# Patient Record
Sex: MALE | Race: WHITE | Employment: OTHER | URBAN - METROPOLITAN AREA
[De-identification: names, ages, dates, MRNs, and addresses within clinical notes are randomized per-mention and may not be internally consistent; named-entity substitution may affect disease eponyms.]

---

## 2018-04-30 ENCOUNTER — HOSPITAL ENCOUNTER (EMERGENCY)
Age: 73
Discharge: HOME | End: 2018-04-30
Payer: MEDICARE

## 2018-04-30 ENCOUNTER — APPOINTMENT (OUTPATIENT)
Dept: GENERAL RADIOLOGY | Age: 73
End: 2018-04-30
Payer: MEDICARE

## 2018-04-30 VITALS
HEIGHT: 71 IN | OXYGEN SATURATION: 94 % | DIASTOLIC BLOOD PRESSURE: 84 MMHG | WEIGHT: 194 LBS | TEMPERATURE: 98 F | SYSTOLIC BLOOD PRESSURE: 130 MMHG | HEART RATE: 84 BPM | BODY MASS INDEX: 27.16 KG/M2 | RESPIRATION RATE: 18 BRPM

## 2018-04-30 DIAGNOSIS — R07.81 RIB PAIN ON RIGHT SIDE: Primary | ICD-10-CM

## 2018-04-30 PROCEDURE — 99212 OFFICE O/P EST SF 10 MIN: CPT

## 2018-04-30 PROCEDURE — 71046 X-RAY EXAM CHEST 2 VIEWS: CPT

## 2018-04-30 RX ORDER — HYDROCODONE BITARTRATE AND ACETAMINOPHEN 5; 325 MG/1; MG/1
1 TABLET ORAL EVERY 6 HOURS PRN
Qty: 12 TABLET | Refills: 0 | Status: SHIPPED | OUTPATIENT
Start: 2018-04-30 | End: 2018-05-03

## 2018-04-30 RX ORDER — PREDNISONE 20 MG/1
20 TABLET ORAL DAILY PRN
COMMUNITY

## 2018-04-30 RX ORDER — FOLIC ACID 1 MG/1
1 TABLET ORAL DAILY
COMMUNITY
End: 2019-06-18 | Stop reason: ALTCHOICE

## 2018-04-30 RX ORDER — SIMVASTATIN 10 MG
10 TABLET ORAL NIGHTLY
Status: ON HOLD | COMMUNITY
End: 2020-11-19 | Stop reason: HOSPADM

## 2018-04-30 ASSESSMENT — PAIN DESCRIPTION - PAIN TYPE: TYPE: ACUTE PAIN

## 2018-04-30 ASSESSMENT — PAIN DESCRIPTION - DESCRIPTORS: DESCRIPTORS: SHARP

## 2018-04-30 ASSESSMENT — PAIN SCALES - GENERAL: PAINLEVEL_OUTOF10: 8

## 2018-04-30 ASSESSMENT — PAIN DESCRIPTION - ONSET: ONSET: SUDDEN

## 2018-04-30 ASSESSMENT — PAIN DESCRIPTION - FREQUENCY: FREQUENCY: INTERMITTENT

## 2018-04-30 ASSESSMENT — PAIN DESCRIPTION - ORIENTATION: ORIENTATION: RIGHT

## 2018-04-30 ASSESSMENT — PAIN DESCRIPTION - LOCATION: LOCATION: RIB CAGE

## 2018-04-30 ASSESSMENT — PAIN DESCRIPTION - PROGRESSION: CLINICAL_PROGRESSION: GRADUALLY WORSENING

## 2019-06-18 ENCOUNTER — APPOINTMENT (OUTPATIENT)
Dept: CT IMAGING | Age: 74
End: 2019-06-18
Payer: MEDICARE

## 2019-06-18 ENCOUNTER — HOSPITAL ENCOUNTER (EMERGENCY)
Age: 74
Discharge: ANOTHER HOSPITAL | End: 2019-06-18
Payer: MEDICARE

## 2019-06-18 ENCOUNTER — HOSPITAL ENCOUNTER (EMERGENCY)
Age: 74
Discharge: LEFT BEFORE DISCHARGE | End: 2019-06-18
Attending: EMERGENCY MEDICINE
Payer: MEDICARE

## 2019-06-18 VITALS
RESPIRATION RATE: 20 BRPM | SYSTOLIC BLOOD PRESSURE: 155 MMHG | TEMPERATURE: 97.5 F | HEART RATE: 84 BPM | OXYGEN SATURATION: 95 % | BODY MASS INDEX: 25.76 KG/M2 | WEIGHT: 184 LBS | DIASTOLIC BLOOD PRESSURE: 90 MMHG | HEIGHT: 71 IN

## 2019-06-18 VITALS
TEMPERATURE: 98 F | WEIGHT: 185.25 LBS | DIASTOLIC BLOOD PRESSURE: 94 MMHG | HEIGHT: 71 IN | OXYGEN SATURATION: 95 % | RESPIRATION RATE: 16 BRPM | HEART RATE: 76 BPM | BODY MASS INDEX: 25.94 KG/M2 | SYSTOLIC BLOOD PRESSURE: 147 MMHG

## 2019-06-18 DIAGNOSIS — R10.9 ABDOMINAL PAIN, UNSPECIFIED ABDOMINAL LOCATION: Primary | ICD-10-CM

## 2019-06-18 DIAGNOSIS — K57.20 DIVERTICULITIS OF LARGE INTESTINE WITH PERFORATION WITHOUT BLEEDING: Primary | ICD-10-CM

## 2019-06-18 PROBLEM — R19.8 PERFORATED VISCUS: Status: ACTIVE | Noted: 2019-06-18

## 2019-06-18 LAB
ALBUMIN SERPL-MCNC: 4 G/DL (ref 3.5–5.2)
ALP BLD-CCNC: 86 U/L (ref 40–129)
ALT SERPL-CCNC: 16 U/L (ref 0–40)
ANION GAP SERPL CALCULATED.3IONS-SCNC: 14 MMOL/L (ref 7–16)
AST SERPL-CCNC: 19 U/L (ref 0–39)
BASOPHILS ABSOLUTE: 0 E9/L (ref 0–0.2)
BASOPHILS RELATIVE PERCENT: 0.3 % (ref 0–2)
BILIRUB SERPL-MCNC: 0.5 MG/DL (ref 0–1.2)
BUN BLDV-MCNC: 15 MG/DL (ref 8–23)
CALCIUM SERPL-MCNC: 9.4 MG/DL (ref 8.6–10.2)
CHLORIDE BLD-SCNC: 99 MMOL/L (ref 98–107)
CO2: 26 MMOL/L (ref 22–29)
CREAT SERPL-MCNC: 1 MG/DL (ref 0.7–1.2)
EKG ATRIAL RATE: 76 BPM
EKG P AXIS: 71 DEGREES
EKG P-R INTERVAL: 144 MS
EKG Q-T INTERVAL: 378 MS
EKG QRS DURATION: 84 MS
EKG QTC CALCULATION (BAZETT): 425 MS
EKG R AXIS: 14 DEGREES
EKG T AXIS: 70 DEGREES
EKG VENTRICULAR RATE: 76 BPM
EOSINOPHILS ABSOLUTE: 0 E9/L (ref 0.05–0.5)
EOSINOPHILS RELATIVE PERCENT: 0.2 % (ref 0–6)
GFR AFRICAN AMERICAN: >60
GFR NON-AFRICAN AMERICAN: >60 ML/MIN/1.73
GLUCOSE BLD-MCNC: 105 MG/DL (ref 74–99)
HCT VFR BLD CALC: 47.7 % (ref 37–54)
HEMOGLOBIN: 15.8 G/DL (ref 12.5–16.5)
LIPASE: 15 U/L (ref 13–60)
LYMPHOCYTES ABSOLUTE: 1.21 E9/L (ref 1.5–4)
LYMPHOCYTES RELATIVE PERCENT: 7 % (ref 20–42)
MCH RBC QN AUTO: 31.6 PG (ref 26–35)
MCHC RBC AUTO-ENTMCNC: 33.1 % (ref 32–34.5)
MCV RBC AUTO: 95.4 FL (ref 80–99.9)
MONOCYTES ABSOLUTE: 0.86 E9/L (ref 0.1–0.95)
MONOCYTES RELATIVE PERCENT: 5.3 % (ref 2–12)
NEUTROPHILS ABSOLUTE: 15.22 E9/L (ref 1.8–7.3)
NEUTROPHILS RELATIVE PERCENT: 87.7 % (ref 43–80)
PDW BLD-RTO: 12.8 FL (ref 11.5–15)
PLATELET # BLD: 373 E9/L (ref 130–450)
PMV BLD AUTO: 10.3 FL (ref 7–12)
POTASSIUM REFLEX MAGNESIUM: 4.2 MMOL/L (ref 3.5–5)
RBC # BLD: 5 E12/L (ref 3.8–5.8)
RBC # BLD: NORMAL 10*6/UL
SODIUM BLD-SCNC: 139 MMOL/L (ref 132–146)
TOTAL PROTEIN: 7.8 G/DL (ref 6.4–8.3)
TROPONIN: <0.01 NG/ML (ref 0–0.03)
WBC # BLD: 17.3 E9/L (ref 4.5–11.5)

## 2019-06-18 PROCEDURE — 1200000000 HC SEMI PRIVATE

## 2019-06-18 PROCEDURE — 99284 EMERGENCY DEPT VISIT MOD MDM: CPT

## 2019-06-18 PROCEDURE — 93005 ELECTROCARDIOGRAM TRACING: CPT | Performed by: EMERGENCY MEDICINE

## 2019-06-18 PROCEDURE — 6360000002 HC RX W HCPCS: Performed by: EMERGENCY MEDICINE

## 2019-06-18 PROCEDURE — 80053 COMPREHEN METABOLIC PANEL: CPT

## 2019-06-18 PROCEDURE — 96375 TX/PRO/DX INJ NEW DRUG ADDON: CPT

## 2019-06-18 PROCEDURE — 2500000003 HC RX 250 WO HCPCS: Performed by: EMERGENCY MEDICINE

## 2019-06-18 PROCEDURE — 74177 CT ABD & PELVIS W/CONTRAST: CPT

## 2019-06-18 PROCEDURE — 85025 COMPLETE CBC W/AUTO DIFF WBC: CPT

## 2019-06-18 PROCEDURE — 36415 COLL VENOUS BLD VENIPUNCTURE: CPT

## 2019-06-18 PROCEDURE — 84484 ASSAY OF TROPONIN QUANT: CPT

## 2019-06-18 PROCEDURE — 83690 ASSAY OF LIPASE: CPT

## 2019-06-18 PROCEDURE — 99212 OFFICE O/P EST SF 10 MIN: CPT

## 2019-06-18 PROCEDURE — 93010 ELECTROCARDIOGRAM REPORT: CPT | Performed by: INTERNAL MEDICINE

## 2019-06-18 PROCEDURE — 2580000003 HC RX 258: Performed by: EMERGENCY MEDICINE

## 2019-06-18 PROCEDURE — 6360000004 HC RX CONTRAST MEDICATION: Performed by: RADIOLOGY

## 2019-06-18 PROCEDURE — 96365 THER/PROPH/DIAG IV INF INIT: CPT

## 2019-06-18 RX ORDER — SODIUM CHLORIDE 0.9 % (FLUSH) 0.9 %
10 SYRINGE (ML) INJECTION EVERY 12 HOURS SCHEDULED
Status: CANCELLED | OUTPATIENT
Start: 2019-06-18

## 2019-06-18 RX ORDER — MORPHINE SULFATE 4 MG/ML
4 INJECTION, SOLUTION INTRAMUSCULAR; INTRAVENOUS
Status: CANCELLED | OUTPATIENT
Start: 2019-06-18

## 2019-06-18 RX ORDER — SODIUM CHLORIDE 0.9 % (FLUSH) 0.9 %
10 SYRINGE (ML) INJECTION PRN
Status: CANCELLED | OUTPATIENT
Start: 2019-06-18

## 2019-06-18 RX ORDER — ONDANSETRON 2 MG/ML
4 INJECTION INTRAMUSCULAR; INTRAVENOUS EVERY 6 HOURS PRN
Status: CANCELLED | OUTPATIENT
Start: 2019-06-18

## 2019-06-18 RX ORDER — MORPHINE SULFATE 4 MG/ML
2 INJECTION, SOLUTION INTRAMUSCULAR; INTRAVENOUS
Status: CANCELLED | OUTPATIENT
Start: 2019-06-18

## 2019-06-18 RX ORDER — SODIUM CHLORIDE, SODIUM LACTATE, POTASSIUM CHLORIDE, CALCIUM CHLORIDE 600; 310; 30; 20 MG/100ML; MG/100ML; MG/100ML; MG/100ML
INJECTION, SOLUTION INTRAVENOUS CONTINUOUS
Status: CANCELLED | OUTPATIENT
Start: 2019-06-18

## 2019-06-18 RX ORDER — ACETAMINOPHEN 325 MG/1
650 TABLET ORAL EVERY 4 HOURS PRN
Status: CANCELLED | OUTPATIENT
Start: 2019-06-18

## 2019-06-18 RX ORDER — CEFDINIR 300 MG/1
300 CAPSULE ORAL 2 TIMES DAILY
Qty: 20 CAPSULE | Refills: 0 | Status: SHIPPED | OUTPATIENT
Start: 2019-06-18 | End: 2019-06-28

## 2019-06-18 RX ORDER — CIPROFLOXACIN 2 MG/ML
400 INJECTION, SOLUTION INTRAVENOUS EVERY 12 HOURS
Status: CANCELLED | OUTPATIENT
Start: 2019-06-18

## 2019-06-18 RX ORDER — METRONIDAZOLE 500 MG/1
500 TABLET ORAL 2 TIMES DAILY
Qty: 20 TABLET | Refills: 0 | Status: SHIPPED | OUTPATIENT
Start: 2019-06-18 | End: 2019-06-28

## 2019-06-18 RX ADMIN — IOPAMIDOL 80 ML: 755 INJECTION, SOLUTION INTRAVENOUS at 11:37

## 2019-06-18 RX ADMIN — IOHEXOL 50 ML: 240 INJECTION, SOLUTION INTRATHECAL; INTRAVASCULAR; INTRAVENOUS; ORAL at 11:37

## 2019-06-18 RX ADMIN — METRONIDAZOLE 500 MG: 500 INJECTION, SOLUTION INTRAVENOUS at 12:55

## 2019-06-18 RX ADMIN — WATER 2 G: 1 INJECTION INTRAMUSCULAR; INTRAVENOUS; SUBCUTANEOUS at 12:54

## 2019-06-18 ASSESSMENT — PAIN DESCRIPTION - LOCATION
LOCATION: ABDOMEN
LOCATION: ABDOMEN

## 2019-06-18 ASSESSMENT — PAIN - FUNCTIONAL ASSESSMENT: PAIN_FUNCTIONAL_ASSESSMENT: PREVENTS OR INTERFERES SOME ACTIVE ACTIVITIES AND ADLS

## 2019-06-18 ASSESSMENT — PAIN DESCRIPTION - PAIN TYPE
TYPE: ACUTE PAIN
TYPE: ACUTE PAIN

## 2019-06-18 ASSESSMENT — PAIN DESCRIPTION - ONSET
ONSET: ON-GOING
ONSET: GRADUAL

## 2019-06-18 ASSESSMENT — PAIN DESCRIPTION - DESCRIPTORS
DESCRIPTORS: CONSTANT;DISCOMFORT;SHARP
DESCRIPTORS: SHARP

## 2019-06-18 ASSESSMENT — PAIN DESCRIPTION - FREQUENCY
FREQUENCY: INTERMITTENT
FREQUENCY: CONTINUOUS

## 2019-06-18 ASSESSMENT — PAIN DESCRIPTION - ORIENTATION
ORIENTATION: RIGHT;LEFT;MID
ORIENTATION: LOWER;MID

## 2019-06-18 ASSESSMENT — PAIN DESCRIPTION - PROGRESSION: CLINICAL_PROGRESSION: GRADUALLY WORSENING

## 2019-06-18 ASSESSMENT — PAIN SCALES - GENERAL
PAINLEVEL_OUTOF10: 8
PAINLEVEL_OUTOF10: 8

## 2019-06-18 NOTE — ED PROVIDER NOTES
HPI:  6/18/19, Time: 10:04 AM         Marcy Grimaldo is a 75 y.o. male presenting to the ED for abdominal pain, beginning about a week ago. The complaint has been intermittent, moderate in severity, and worsened by nothing. Patient states that intermittently over the last week he has had some lower abdominal pain which she describes as crampy and aching. He also states that he has had a change in his bowel habits. He states that he feels constipated, that he has to strain very hard to pass stools and they are small and firm. He denies any blood in the stool or pain with passing the stool. He denies any nausea or vomiting. He denies any fever or chills. He denies any urinary changes. He denies any previous abdominal surgeries. Review of Systems:   Pertinent positives and negatives are stated within HPI, all other systems reviewed and are negative.          --------------------------------------------- PAST HISTORY ---------------------------------------------  Past Medical History:  has a past medical history of Arthritis and Hyperlipidemia. Past Surgical History:  has a past surgical history that includes fracture surgery. Social History:  reports that he quit smoking about 3 years ago. His smoking use included cigarettes. He has never used smokeless tobacco. He reports that he drinks alcohol. He reports that he does not use drugs. Family History: family history is not on file. The patients home medications have been reviewed. Allergies: Patient has no known allergies.     -------------------------------------------------- RESULTS -------------------------------------------------  All laboratory and radiology results have been personally reviewed by myself   LABS:  Results for orders placed or performed during the hospital encounter of 06/18/19   CBC Auto Differential   Result Value Ref Range    WBC 17.3 (H) 4.5 - 11.5 E9/L    RBC 5.00 3.80 - 5.80 E12/L    Hemoglobin 15.8 12.5 - 16.5 g/dL    Hematocrit 47.7 37.0 - 54.0 %    MCV 95.4 80.0 - 99.9 fL    MCH 31.6 26.0 - 35.0 pg    MCHC 33.1 32.0 - 34.5 %    RDW 12.8 11.5 - 15.0 fL    Platelets 626 911 - 918 E9/L    MPV 10.3 7.0 - 12.0 fL    Neutrophils % 87.7 (H) 43.0 - 80.0 %    Lymphocytes % 7.0 (L) 20.0 - 42.0 %    Monocytes % 5.3 2.0 - 12.0 %    Eosinophils % 0.2 0.0 - 6.0 %    Basophils % 0.3 0.0 - 2.0 %    Neutrophils # 15.22 (H) 1.80 - 7.30 E9/L    Lymphocytes # 1.21 (L) 1.50 - 4.00 E9/L    Monocytes # 0.86 0.10 - 0.95 E9/L    Eosinophils # 0.00 (L) 0.05 - 0.50 E9/L    Basophils # 0.00 0.00 - 0.20 E9/L    RBC Morphology Normal    Comprehensive Metabolic Panel w/ Reflex to MG   Result Value Ref Range    Sodium 139 132 - 146 mmol/L    Potassium reflex Magnesium 4.2 3.5 - 5.0 mmol/L    Chloride 99 98 - 107 mmol/L    CO2 26 22 - 29 mmol/L    Anion Gap 14 7 - 16 mmol/L    Glucose 105 (H) 74 - 99 mg/dL    BUN 15 8 - 23 mg/dL    CREATININE 1.0 0.7 - 1.2 mg/dL    GFR Non-African American >60 >=60 mL/min/1.73    GFR African American >60     Calcium 9.4 8.6 - 10.2 mg/dL    Total Protein 7.8 6.4 - 8.3 g/dL    Alb 4.0 3.5 - 5.2 g/dL    Total Bilirubin 0.5 0.0 - 1.2 mg/dL    Alkaline Phosphatase 86 40 - 129 U/L    ALT 16 0 - 40 U/L    AST 19 0 - 39 U/L   Lipase   Result Value Ref Range    Lipase 15 13 - 60 U/L   Troponin   Result Value Ref Range    Troponin <0.01 0.00 - 0.03 ng/mL   EKG 12 Lead   Result Value Ref Range    Ventricular Rate 76 BPM    Atrial Rate 76 BPM    P-R Interval 144 ms    QRS Duration 84 ms    Q-T Interval 378 ms    QTc Calculation (Bazett) 425 ms    P Axis 71 degrees    R Axis 14 degrees    T Axis 70 degrees       RADIOLOGY:  Interpreted by Radiologist.  CT ABDOMEN PELVIS W IV CONTRAST Additional Contrast? Oral   Final Result   1. Abnormal appearance of the sigmoid segment of colon. Differential   favors diverticulitis with contained mesenteric perforation. No   drainable abscess. Perforated neoplasm is not excluded.    2. Other Given 6/18/19 1137)   cefTRIAXone (ROCEPHIN) 2 g in sterile water 20 mL IV syringe (2 g Intravenous Given 6/18/19 1254)   metronidazole (FLAGYL) 500 mg in NaCl 100 mL IVPB premix (0 mg Intravenous Stopped 6/18/19 1358)         ED COURSE:  ED Course as of Jun 18 1359   Tue Jun 18, 2019   1214   ATTENDING PROVIDER ATTESTATION:     I have personally performed and/or participated in the history, exam, medical decision making, and procedures and agree with all pertinent clinical information unless otherwise noted. I have also reviewed and agree with the past medical, family and social history unless otherwise noted. I have discussed this patient in detail with the resident and provided the instruction and education regarding the evidence-based evaluation and treatment of [unfilled]  History: patient began with abdominal pain 6 days ago. He states the pain improved and then return 3 days ago getting worse than the 1st episode. He denies n/v/d/c or blood in the stool. My findings: Jacob Orr is a 75 y.o. male whom is in no distress. Physical exam reveals heart RRR, lungs CTA, abdomen is soft and mildly diffuse tenderness. No R/G/R. My plan: Symptomatic and supportive care. Diverticulitis with micro perforation noted on CT. Will discuss with surgery. Electronically signed by Musa Paige DO on 6/18/19 at 12:14 PM          [JS]   4597 Patient does have evidence of diverticulitis with mesentery perforation. Updated him about this finding and the possible need for admission. He does not want to be admitted but is agreeable with us speaking to general surgery about the findings. He denies new or worsening pain and his abdominal exam is unchanged. [BM]   [phone] Spoke with Dr. Giovanni Aase who recommends admitting the patient for IV antibiotics.      [BM]   2636 Discussed with the patient the recommendation of admission for IV antibiotics and he states that he is agreeable to get a first dose of IV antibiotics and he wants antibiotics to go home with but he cannot stay in the hospital as his granddaughter is coming to visit from [place]. Patient will have his first dose of IV antibiotics and explained that if it anytime he changes his mind and wishes to come back for evaluation and possible admission he is welcome. [BM]      ED Course User Index  [BM] Suzie Zaragoza DO  [JS] Hair Galloway DO     This patient has chosen to leave against medical advice. I have personally explained to them that choosing to do so may result in permanent bodily harm or death. I discussed at length that without further evaluation and monitoring there may be unforeseen circumstances and deterioration resulting in permanent bodily harm or death as a result of their choice. They are alert, oriented, and competent at this time. They state that they are aware of the serious risks as explained, but they continue to wish to leave against medical advice. In light of their decision to leave against medical advice, follow-up has been arranged and they are aware of the importance of following up as instructed. They have been advised that they should return to the ED immediately if they change their mind at any time, or if their condition begins to change or worsen. --------------------------------- IMPRESSION AND DISPOSITION ---------------------------------    IMPRESSION  1.  Diverticulitis of large intestine with perforation without bleeding        DISPOSITION  Disposition: Other Disposition: Left AMA  Patient condition is unknown       Suzie Zaragoza DO  Resident  06/18/19 2851

## 2019-07-08 ENCOUNTER — OFFICE VISIT (OUTPATIENT)
Dept: SURGERY | Age: 74
End: 2019-07-08
Payer: MEDICARE

## 2019-07-08 VITALS
BODY MASS INDEX: 25.76 KG/M2 | RESPIRATION RATE: 20 BRPM | TEMPERATURE: 98.3 F | WEIGHT: 184 LBS | SYSTOLIC BLOOD PRESSURE: 118 MMHG | HEIGHT: 71 IN | OXYGEN SATURATION: 96 % | HEART RATE: 96 BPM | DIASTOLIC BLOOD PRESSURE: 73 MMHG

## 2019-07-08 DIAGNOSIS — K57.20 DIVERTICULITIS OF LARGE INTESTINE WITH PERFORATION WITHOUT ABSCESS OR BLEEDING: ICD-10-CM

## 2019-07-08 DIAGNOSIS — K57.32 DIVERTICULITIS OF COLON: Primary | ICD-10-CM

## 2019-07-08 PROCEDURE — 99204 OFFICE O/P NEW MOD 45 MIN: CPT | Performed by: SURGERY

## 2019-07-08 NOTE — PATIENT INSTRUCTIONS
Patient Education   Patient Education        Diverticulitis: Care Instructions  Your Care Instructions    Diverticulitis occurs when pouches form in the wall of the colon and become inflamed or infected. It can be very painful. Doctors aren't sure what causes diverticulitis. There is no proof that foods such as nuts, seeds, or berries cause it or make it worse. A low-fiber diet may cause the colon to work harder to push stool forward. Pouches may form because of this extra work. It may be hard to think about healthy eating while you're in pain. But as you recover, you might think about how you can use healthy eating for overall better health. Healthy eating may help you avoid future attacks. Follow-up care is a key part of your treatment and safety. Be sure to make and go to all appointments, and call your doctor if you are having problems. It's also a good idea to know your test results and keep a list of the medicines you take. How can you care for yourself at home? · Drink plenty of fluids, enough so that your urine is light yellow or clear like water. If you have kidney, heart, or liver disease and have to limit fluids, talk with your doctor before you increase the amount of fluids you drink. · Stick to liquids or a bland diet (plain rice, bananas, dry toast or crackers, applesauce) until you are feeling better. Then you can return to regular foods and gradually increase the amount of fiber in your diet. · Use a heating pad set on low on your belly to relieve mild cramps and pain. · Get extra rest until you are feeling better. · Be safe with medicines. Read and follow all instructions on the label. ? If the doctor gave you a prescription medicine for pain, take it as prescribed. ? If you are not taking a prescription pain medicine, ask your doctor if you can take an over-the-counter medicine. · If your doctor prescribed antibiotics, take them as directed.  Do not stop taking them just because you feel

## 2019-07-08 NOTE — PROGRESS NOTES
General Surgery History and Physical    Patient's Name/Date of Birth: Kavitha Garcia / [birth year]    Date: July 8, 2019     Surgeon: Alexandro Perdomo M.D.    PCP: Kelvin Moran DO     Chief Complaint: recent diverticulitis    HPI:   Kavitha Garcia is a 75 y.o. male who presents for evaluation of recent diverticulitis and was treated as outpt with abx and has recovered without issue. No previous coloscopy. Past Medical History:   Diagnosis Date    Arthritis     Hyperlipidemia        Past Surgical History:   Procedure Laterality Date    FRACTURE SURGERY         Current Outpatient Medications   Medication Sig Dispense Refill    methotrexate (RHEUMATREX) 2.5 MG chemo tablet Take 12.5 mg by mouth once a week Sunday      predniSONE (DELTASONE) 20 MG tablet Take 20 mg by mouth daily as needed (Arthritis Pain)       simvastatin (ZOCOR) 10 MG tablet Take 10 mg by mouth nightly       No current facility-administered medications for this visit. No Known Allergies    The patient has a family history that is negative for severe cardiovascular or respiratory issues, negative for reaction to anesthesia.     Social History     Socioeconomic History    Marital status:      Spouse name: Not on file    Number of children: Not on file    Years of education: Not on file    Highest education level: Not on file   Occupational History    Not on file   Social Needs    Financial resource strain: Not on file    Food insecurity:     Worry: Not on file     Inability: Not on file    Transportation needs:     Medical: Not on file     Non-medical: Not on file   Tobacco Use    Smoking status: Former Smoker     Types: Cigarettes     Last attempt to quit: 4/30/2016     Years since quitting: 3.1    Smokeless tobacco: Never Used   Substance and Sexual Activity    Alcohol use: Yes     Comment: rare    Drug use: Never    Sexual activity: Not on file   Lifestyle    Physical activity:     Days per week: Not on file

## 2019-07-09 ENCOUNTER — TELEPHONE (OUTPATIENT)
Dept: SURGERY | Age: 74
End: 2019-07-09

## 2019-07-22 ENCOUNTER — PREP FOR PROCEDURE (OUTPATIENT)
Dept: SURGERY | Age: 74
End: 2019-07-22

## 2019-07-22 RX ORDER — SODIUM CHLORIDE, SODIUM LACTATE, POTASSIUM CHLORIDE, CALCIUM CHLORIDE 600; 310; 30; 20 MG/100ML; MG/100ML; MG/100ML; MG/100ML
INJECTION, SOLUTION INTRAVENOUS CONTINUOUS
Status: CANCELLED | OUTPATIENT
Start: 2019-07-22

## 2019-07-26 ENCOUNTER — TELEPHONE (OUTPATIENT)
Dept: SURGERY | Age: 74
End: 2019-07-26

## 2019-09-17 NOTE — ED PROVIDER NOTES
Apply clobetasol ointment twice daily as needed to rash on the back until clear. Restart as needed.     Wash scalp with salicylic acid shampoo 2-3 times weekly. You can get this over the counter at a drug store. T-sal is one example. Samples were given in clinic. Stop ketoconazole shampoo. Apply clobetasol solution twice daily as needed to scaly areas on the scalp until clear.    LIQUID NITROGEN INSTRUCTIONS    Today we treated your actinic keratosis with liquid nitrogen.    Freezing with liquid nitrogen is accompanied by a stinging, burning sensation which usually lasts from 15 minutes up to several hours.     It is normal for a blister to sometimes form at the treatment site.  Occasionally this will be a blood blister.  It is usually best to leave the blister unopened.  The blister normally breaks in the first few days, but on the fingers it can last longer.  If it is painful, it can be opened after cleansing the area with soap and water followed by rubbing alcohol.  A needle that has been sterilized with a match and then wiped clean with rubbing alcohol can be used to open the blister.  Wash hands before opening the blister. The blister may form into a dry crust.  The crust should peel off in 2-4 weeks.  There may be some minimal redness after the crust falls off, but this should fade with time.     It is okay to wash, shampoo, shower or apply cosmetics to the area, but the area should not be rubbed as this can irritate it.  Apply Vaseline (petroleum jelly)  to the treated area if it is raw.    Possible side effects: risk of permanent skin color change (lighter or darker skin), persistence, blister, crusting, discomfort, infection.    Please call if any questions or concerns. We can be reached at:        [facility]: [phone]  [facility]: [phone]    Maggy Mcgarry MD        WOUND CARE INSTRUCTIONS AFTER SKIN BIOPSY    · Leave the bandage DRY and IN-PLACE for 24 hours.    · Wash your  This is a 67-year-old male the presents to urgent care with a one-week complaint of increasingly constant mid to lower abdominal pain which has become more intense as a week is gone by. Mild nausea. No vomiting or diarrhea. Has had some decreased bowel movements. No urinary symptoms. Denies back pain. No surgical abdominal history. Denies chest pain or shortness of breath. On first contact the patient appears to be mildly uncomfortable. Patient denies history of EGD or colonoscopy. Review of Systems   Constitutional:        Pertinent positives and negatives are stated within HPI, all other systems reviewed and are negative. Physical Exam   Constitutional: He is oriented to person, place, and time. He appears well-developed and well-nourished. HENT:   Head: Normocephalic and atraumatic. Right Ear: Hearing and external ear normal.   Left Ear: Hearing and external ear normal.   Nose: Nose normal. Right sinus exhibits no maxillary sinus tenderness and no frontal sinus tenderness. Left sinus exhibits no maxillary sinus tenderness and no frontal sinus tenderness. Mouth/Throat: Uvula is midline, oropharynx is clear and moist and mucous membranes are normal. No trismus in the jaw. No uvula swelling. Eyes: Pupils are equal, round, and reactive to light. Conjunctivae, EOM and lids are normal.   Neck: Normal range of motion. Neck supple. Cardiovascular: Normal rate, regular rhythm and normal heart sounds. No murmur heard. Pulmonary/Chest: Effort normal and breath sounds normal.   Abdominal: Soft. Bowel sounds are normal. There is tenderness. There is no rigidity, no rebound, no guarding and no CVA tenderness. Mid to lower abdominal tenderness. Abdomen is soft. Patient uncomfortable during exam.  Bowel sounds are active. Musculoskeletal: He exhibits no edema. Neurological: He is alert and oriented to person, place, and time. He has normal strength.  No cranial nerve deficit or hands and remove the bandage. (If the bandage is hard to remove, soak the bandage with water.) You may now get the WOUND wet with a bath or shower. BEGIN WOUND CARE.     · CLEAN the wound ONCE A DAY with a mild non-fragranced soap, water, and a Q-tip/cotton ball. Roll the soapy Q-tip/cotton ball over the Biopsied site, removing as much crust as possible without scrubbing at the area.     · GREASE: Pat dry and apply a generous amount of Vaseline (white petrolatum) on the Biopsied site using a Q-tip.      · COVER: Use a bandage or dressing that covers the Biopsied site completely.The site should be completely covered by the bandage.    **Continue wound care DAILY for 2 weeks or until you return for the removal of the stitches.      **If you have a beard, do not shave 1 inch around the Biopsied area until it is fully healed.      Call our office immediately if any:  Bleeding occurs that saturates the dressing-  Leave the bandage in place and apply pressure and ice for 20 minutes.  Increase redness or pus-like drainage   Excessive Swelling   Increasing pain    (A pink rim surrounding the site is normal.)    · You will be notified of your Biopsy Results by letter, phone call, or at a recheck appointment. If 2 weeks or more have passed since your procedure and you have not received your results, please call our office.       CONTACT:  Dermatology Department- Dr. Mcgarry  [facility]: [phone]  [facility]: [phone]             sensory deficit. Coordination and gait normal. GCS eye subscore is 4. GCS verbal subscore is 5. GCS motor subscore is 6. Skin: Skin is warm and dry. No abrasion and no rash noted. Nursing note and vitals reviewed. Procedures    MDM  Number of Diagnoses or Management Options  Abdominal pain, unspecified abdominal location:   Diagnosis management comments: This is a 40-year-old male with continued mid to lower abdominal pain. Recommend patient go to the emergency department after discharge from urgent care for evaluation of this abdominal pain. Differential includes diverticulitis, colitis constipation. Bowel obstruction or bowel perforation. He agrees to go. Patient is stable to go to hospital by private vehicle.         --------------------------------------------- PAST HISTORY ---------------------------------------------  Past Medical History:  has a past medical history of Arthritis and Hyperlipidemia. Past Surgical History:  has a past surgical history that includes fracture surgery. Social History:  reports that he quit smoking about 3 years ago. His smoking use included cigarettes. He has never used smokeless tobacco.    Family History: family history is not on file. The patients home medications have been reviewed. Allergies: Patient has no known allergies. -------------------------------------------------- RESULTS -------------------------------------------------  No results found for this visit on 06/18/19. No orders to display       ------------------------- NURSING NOTES AND VITALS REVIEWED ---------------------------   The nursing notes within the ED encounter and vital signs as below have been reviewed.    BP (!) 155/90   Pulse 84   Temp 97.5 °F (36.4 °C) (Oral)   Resp 20   Ht 5' 11\" (1.803 m)   Wt 184 lb (83.5 kg)   SpO2 95%   BMI 25.66 kg/m²   Oxygen Saturation Interpretation: Normal      ------------------------------------------ PROGRESS NOTES ------------------------------------------   I have spoken with the patient and discussed todays results, in addition to providing specific details for the plan of care and counseling regarding the diagnosis and prognosis. Their questions are answered at this time and they are agreeable with the plan.      --------------------------------- ADDITIONAL PROVIDER NOTES ---------------------------------     This patient is stable for discharge. I have shared the specific conditions for return, as well as the importance of follow-up. * NOTE: This report was transcribed using voice recognition software. Every effort was made to ensure accuracy; however, inadvertent computerized transcription errors may be present.    --------------------------------- IMPRESSION AND DISPOSITION ---------------------------------    IMPRESSION  1.  Abdominal pain, unspecified abdominal location        DISPOSITION  Disposition: Discharge to ER  Patient condition is good         Sin Goddard PA-C  06/18/19 9902

## 2020-10-27 ENCOUNTER — HOSPITAL ENCOUNTER (EMERGENCY)
Age: 75
Discharge: HOME | End: 2020-10-27
Attending: EMERGENCY MEDICINE
Payer: MEDICARE

## 2020-10-27 ENCOUNTER — APPOINTMENT (OUTPATIENT)
Dept: GENERAL RADIOLOGY | Age: 75
End: 2020-10-27
Payer: MEDICARE

## 2020-10-27 VITALS
RESPIRATION RATE: 20 BRPM | DIASTOLIC BLOOD PRESSURE: 86 MMHG | OXYGEN SATURATION: 91 % | WEIGHT: 200 LBS | HEART RATE: 98 BPM | HEIGHT: 71 IN | TEMPERATURE: 97.5 F | BODY MASS INDEX: 28 KG/M2 | SYSTOLIC BLOOD PRESSURE: 140 MMHG

## 2020-10-27 PROCEDURE — 73110 X-RAY EXAM OF WRIST: CPT

## 2020-10-27 PROCEDURE — 29125 APPL SHORT ARM SPLINT STATIC: CPT

## 2020-10-27 PROCEDURE — 99283 EMERGENCY DEPT VISIT LOW MDM: CPT

## 2020-10-27 PROCEDURE — 73130 X-RAY EXAM OF HAND: CPT

## 2020-10-27 PROCEDURE — 25600 CLTX DST RDL FX/EPHYS SEP WO: CPT

## 2020-10-27 RX ORDER — HYDROCODONE BITARTRATE AND ACETAMINOPHEN 5; 325 MG/1; MG/1
1 TABLET ORAL EVERY 6 HOURS PRN
Qty: 12 TABLET | Refills: 0 | Status: SHIPPED | OUTPATIENT
Start: 2020-10-27 | End: 2020-10-30

## 2020-10-27 NOTE — ED PROVIDER NOTES
ED Attending  CC: No                                                                                                                                        Department of Emergency Medicine   ED  Provider Note  Admit Date/RoomTime: 10/27/2020 12:16 PM  ED Room: 15/15        HPI:  10/27/20,   Time: 12:28 PM EDT         Maricarmen Adler is a 67 y.o. male presenting to the ED for fall with left wrist/hand pain, beginning 1 day ago. The complaint has been persistent, moderate in severity, and worsened by movement of left wrist/hand. Patient states that he slipped on some wet leaves last evening and fell sideways. He reports that he landed somehow on his left wrist.  He is not certain if his wrist was flexed or extended at the time. He states that he did not think he really injured it that badly but this morning when he woke up he had quite a bit of swelling and bruising over the site. Most of the pain is over the distal surface of the wrist.  He is able to move the fingers though it is uncomfortable. Denies any left elbow or shoulder pain. The patient states he did not hit his head or lose consciousness. He is not on any blood thinners.   He does take methotrexate and steroids for arthritis      ROS:     Constitutional: Negative for fever and chills  HENT: Negative for ear pain, sore throat and sinus pressure  Eyes: Negative for pain, discharge and redness  Respiratory:  Negative for shortness of breath, cough and wheezing  Cardiovascular: Negative for CP, edema or palpitations  Gastrointestinal: Negative for nausea, vomiting, diarrhea and abdominal distention  Genitourinary: Negative for dysuria and frequency  Musculoskeletal:  See HPI  Skin: Negative for rash and wound  Neurological: Negative for weakness and headaches  Hematological: Negative for adenopathy    All other systems reviewed and are negative      -------------------------------- PAST HISTORY ----------------------------------  Past Medical History: has marked swelling and bruising dorsal surface distal left radius/ulna and dorsum left hand. Markedly decreased ROM left wrist due to pain. Sensation and pulses intact. He is moving all fingers and able to partially make a fist.   Some limitations due to swelling and pain. Warm and well perfused  Skin: See above. Neurologic: GCS 15,  Intact. No focal deficits  Psych: Normal Affect      ------------------------ ED COURSE/MEDICAL DECISION MAKING----------------------  Medications - No data to display      Medical Decision Making:    Please see Dr. Jovana Dunn note for the hematoma block and reduction. The patient will be followed up with Dr. Yonathan Juarez in the office. Dr. Vianney Menezes did review the x-rays with him and he felt that this was probably the best we be able to get it positioned until he sees the patient. He states that the fracture is so comminuted that even if they got into better position it would probably just slide right off. The patient will be discharged home with a splint and sling. We will give him some meds for pain. He can follow-up with Dr. Yonathan Juarez tomorrow morning as discussed       Counseling: The emergency provider has spoken with the patient and discussed todays results, in addition to providing specific details for the plan of care and counseling regarding the diagnosis and prognosis. Questions are answered at this time and they are agreeable with the plan.      ------------------------ IMPRESSION AND DISPOSITION -------------------------------    IMPRESSION  1.  Closed fracture of distal ends of left radius and ulna, initial encounter        DISPOSITION  Disposition: Discharge to home  Patient condition is stable                   Anshu Vivar PA-C  10/27/20 9510

## 2020-11-12 ENCOUNTER — APPOINTMENT (OUTPATIENT)
Dept: MRI IMAGING | Age: 75
DRG: 065 | End: 2020-11-12
Payer: MEDICARE

## 2020-11-12 ENCOUNTER — HOSPITAL ENCOUNTER (INPATIENT)
Age: 75
LOS: 8 days | Discharge: NURSING, REHAB OR LONG-TERM CARE | DRG: 065 | End: 2020-11-20
Attending: EMERGENCY MEDICINE | Admitting: INTERNAL MEDICINE
Payer: MEDICARE

## 2020-11-12 PROBLEM — I63.9 ACUTE CVA (CEREBROVASCULAR ACCIDENT) (HCC): Status: ACTIVE | Noted: 2020-11-12

## 2020-11-12 LAB
CHOLESTEROL, TOTAL: 142 MG/DL (ref 0–199)
HBA1C MFR BLD: 5.7 % (ref 4–5.6)
HDLC SERPL-MCNC: 45 MG/DL
LDL CHOLESTEROL CALCULATED: 80 MG/DL (ref 0–99)
MAGNESIUM: 2 MG/DL (ref 1.6–2.6)
PHOSPHORUS: 2.9 MG/DL (ref 2.5–4.5)
TRIGL SERPL-MCNC: 83 MG/DL (ref 0–149)
TSH SERPL DL<=0.05 MIU/L-ACNC: 0.35 UIU/ML (ref 0.27–4.2)
VLDLC SERPL CALC-MCNC: 17 MG/DL

## 2020-11-12 PROCEDURE — 84100 ASSAY OF PHOSPHORUS: CPT

## 2020-11-12 PROCEDURE — 84443 ASSAY THYROID STIM HORMONE: CPT

## 2020-11-12 PROCEDURE — 99283 EMERGENCY DEPT VISIT LOW MDM: CPT

## 2020-11-12 PROCEDURE — 80061 LIPID PANEL: CPT

## 2020-11-12 PROCEDURE — 70551 MRI BRAIN STEM W/O DYE: CPT

## 2020-11-12 PROCEDURE — 83036 HEMOGLOBIN GLYCOSYLATED A1C: CPT

## 2020-11-12 PROCEDURE — 83735 ASSAY OF MAGNESIUM: CPT

## 2020-11-12 PROCEDURE — 93005 ELECTROCARDIOGRAM TRACING: CPT | Performed by: EMERGENCY MEDICINE

## 2020-11-12 PROCEDURE — 2060000000 HC ICU INTERMEDIATE R&B

## 2020-11-12 RX ORDER — POLYETHYLENE GLYCOL 3350 17 G/17G
17 POWDER, FOR SOLUTION ORAL DAILY PRN
Status: DISCONTINUED | OUTPATIENT
Start: 2020-11-12 | End: 2020-11-20 | Stop reason: HOSPADM

## 2020-11-12 RX ORDER — SODIUM CHLORIDE 0.9 % (FLUSH) 0.9 %
10 SYRINGE (ML) INJECTION PRN
Status: DISCONTINUED | OUTPATIENT
Start: 2020-11-12 | End: 2020-11-20 | Stop reason: HOSPADM

## 2020-11-12 RX ORDER — ATORVASTATIN CALCIUM 80 MG/1
80 TABLET, FILM COATED ORAL NIGHTLY
Status: DISCONTINUED | OUTPATIENT
Start: 2020-11-12 | End: 2020-11-20 | Stop reason: HOSPADM

## 2020-11-12 RX ORDER — ACETAMINOPHEN 325 MG/1
650 TABLET ORAL EVERY 6 HOURS PRN
Status: DISCONTINUED | OUTPATIENT
Start: 2020-11-12 | End: 2020-11-20 | Stop reason: HOSPADM

## 2020-11-12 RX ORDER — SODIUM CHLORIDE 0.9 % (FLUSH) 0.9 %
10 SYRINGE (ML) INJECTION EVERY 12 HOURS SCHEDULED
Status: DISCONTINUED | OUTPATIENT
Start: 2020-11-12 | End: 2020-11-20 | Stop reason: HOSPADM

## 2020-11-12 RX ORDER — IPRATROPIUM BROMIDE AND ALBUTEROL SULFATE 2.5; .5 MG/3ML; MG/3ML
1 SOLUTION RESPIRATORY (INHALATION) EVERY 4 HOURS PRN
Status: DISCONTINUED | OUTPATIENT
Start: 2020-11-12 | End: 2020-11-20 | Stop reason: HOSPADM

## 2020-11-12 RX ORDER — ASPIRIN 81 MG/1
81 TABLET, CHEWABLE ORAL DAILY
Status: DISCONTINUED | OUTPATIENT
Start: 2020-11-13 | End: 2020-11-20 | Stop reason: HOSPADM

## 2020-11-12 RX ORDER — ALBUTEROL SULFATE 90 UG/1
2 AEROSOL, METERED RESPIRATORY (INHALATION) EVERY 6 HOURS PRN
COMMUNITY

## 2020-11-12 RX ORDER — ACETAMINOPHEN 650 MG/1
650 SUPPOSITORY RECTAL EVERY 6 HOURS PRN
Status: DISCONTINUED | OUTPATIENT
Start: 2020-11-12 | End: 2020-11-20 | Stop reason: HOSPADM

## 2020-11-12 ASSESSMENT — PAIN SCALES - GENERAL: PAINLEVEL_OUTOF10: 0

## 2020-11-12 NOTE — ED PROVIDER NOTES
Department of Emergency Medicine   ED  Provider Note  Admit Date/RoomTime: 11/12/2020  6:24 PM  ED Room: 23/23          History of Present Illness:  11/12/20, Time: 6:24 PM EST  Chief Complaint   Patient presents with    Cerebrovascular Accident     CVA transfer from [facility] with right sided weakness, aphasia, and droop. Last seen normal some time yesterday. -thinners 300mg ASA suppository 500mL NS, and 4mg Zofran PTA                Dalila Rodriguez is a 67 y.o. male presenting to the ED for stroke symptoms from outside hospital, beginning yesterday. The complaint has been persistent, moderate in severity, and worsened by nothing. Patient arrives with dysarthria, near aphasia, unable to provide information. I received report from the emergency physician at [facility]. Patient was last seen well yesterday morning. When friends did not hear from him today they went to his house this afternoon and found him lying on the floor, unable to speak with right-sided deficits. Patient was found to have occlusion of the left MCA at outside hospital.  Patient was given rectal aspirin, accepted by [facility] hospitalist.    Review of Systems:   Unable to obtain due to patient condition        --------------------------------------------- PAST HISTORY ---------------------------------------------  Past Medical History:  has a past medical history of Arthritis and Hyperlipidemia. Past Surgical History:  has a past surgical history that includes fracture surgery. Social History:  reports that he quit smoking about 4 years ago. His smoking use included cigarettes. He has never used smokeless tobacco. He reports current alcohol use. He reports that he does not use drugs. Family History: family history is not on file. . Unless otherwise noted, family history is non contributory    The patients home medications have been reviewed.     Allergies: Patient has no known allergies. ---------------------------------------------------PHYSICAL EXAM--------------------------------------    Constitutional/General: Awake and alert, aphasic  Head: Normocephalic and atraumatic  Eyes: Leftward gaze deviation, sclera non icteric  Mouth: Oropharynx clear, handling secretions, no trismus, no asymmetry of the posterior oropharynx or uvular edema  Neck: Supple, full ROM, no stridor, no meningeal signs  Respiratory: Lungs clear to auscultation bilaterally, no wheezes, rales, or rhonchi. Not in respiratory distress  Cardiovascular:  Regular rate. Regular rhythm. No murmurs, no aortic murmurs, no gallops, or rubs. 2+ distal pulses. Equal extremity pulses. Chest: No chest wall tenderness  GI:  Abdomen Soft, Non tender, Non distended. No rebound, guarding, or rigidity. No pulsatile masses. Musculoskeletal:  Warm and well perfused, no clubbing, cyanosis, or edema. Capillary refill <3 seconds  Integument: skin warm and dry. No rashes. Neurologic: GCS 11, leftward gaze deviation, right facial droop with right hemineglect, symmetric strength 5/5 in the upper and lower extremities bilaterally  Psychiatric: Normal Affect          -------------------------------------------------- RESULTS -------------------------------------------------  I have personally reviewed all laboratory and imaging results for this patient. Results are listed below.      LABS: (Lab results interpreted by me)  Results for orders placed or performed during the hospital encounter of 11/12/20   Hemoglobin A1C   Result Value Ref Range    Hemoglobin A1C 5.7 (H) 4.0 - 5.6 %   Magnesium   Result Value Ref Range    Magnesium 2.0 1.6 - 2.6 mg/dL   Phosphorus   Result Value Ref Range    Phosphorus 2.9 2.5 - 4.5 mg/dL   TSH without Reflex   Result Value Ref Range    TSH 0.355 0.270 - 4.200 uIU/mL   ,       RADIOLOGY:  Interpreted by Radiologist unless otherwise specified  MRI BRAIN WO CONTRAST    (Results Pending)         EKG Interpretation  Interpreted by emergency department physician, Dr. Ricci Jimenez    Date of EK20  Time:     Rhythm: normal sinus   Rate: 78  Axis: normal  Conduction: normal  ST Segments: no acute change  T Waves: no acute change    Clinical Impression: No findings suggestive of acute ischemia or injury  Comparison to prior EKG: no previous EKG      ------------------------- NURSING NOTES AND VITALS REVIEWED ---------------------------   The nursing notes within the ED encounter and vital signs as below have been reviewed by myself  BP (!) 132/93   Pulse 82   Temp 97.6 °F (36.4 °C) (Temporal)   Resp 28   SpO2 95%     Oxygen Saturation Interpretation: Normal    The patients available past medical records and past encounters were reviewed. ------------------------------ ED COURSE/MEDICAL DECISION MAKING----------------------  Medications   perflutren lipid microspheres (DEFINITY) injection 1.65 mg (has no administration in time range)   sodium chloride flush 0.9 % injection 10 mL (has no administration in time range)   sodium chloride flush 0.9 % injection 10 mL (has no administration in time range)   acetaminophen (TYLENOL) tablet 650 mg (has no administration in time range)     Or   acetaminophen (TYLENOL) suppository 650 mg (has no administration in time range)   polyethylene glycol (GLYCOLAX) packet 17 g (has no administration in time range)   atorvastatin (LIPITOR) tablet 80 mg (has no administration in time range)   aspirin chewable tablet 81 mg (has no administration in time range)   ipratropium-albuterol (DUONEB) nebulizer solution 1 ampule (has no administration in time range)           The cardiac monitor revealed sinus rhythm with a heart rate in the 80s as interpreted by me. The cardiac monitor was ordered secondary to the patient's chest pain and to monitor for patient for dysrhythmia.    CPT [id number]           Medical Decision Making:     I, Dr. Nael Del Valle, am the primary provider of record    74-year-old male presenting from outside hospital with stroke symptoms. Patient was last seen well over 24 hours ago. Review of his CT imaging shows a large left cerebral infarct with occlusion of the left M1. Patient is outside of the window for any acute interventions. He was accepted by the hospitalist service, there was no bed available, [facility] sent patient to ED although he was admitted to the floor with a room pending. There does not appear to be any acute intervention needs at this time. He continues to have leftward gaze preference with right hemineglect and deficits. Patient had been loaded with aspirin at the outside hospital.  EKG shows no signs of ischemia or injury. Review of records shows no other abnormalities. Spoke with hospitalist service who agreed that he was accepted by their service to the floor but it appears [facility] had sent the patient to the ED as opposed to waiting for a bed for admission. Patient remained in the ED until his bed was available, no change in status. Re-Evaluations: This patient's ED course included: a personal history and physicial examination, re-evaluation prior to disposition, cardiac monitoring and continuous pulse oximetry    This patient has remained hemodynamically stable, remained unchanged and been closely monitored during their ED course.                 --------------------------------- IMPRESSION AND DISPOSITION ---------------------------------    IMPRESSION  1. Cerebrovascular accident (CVA), unspecified mechanism ([facility] [address]        DISPOSITION  Disposition: Admit to telemetry  Patient condition is stable        NOTE: This report was transcribed using voice recognition software.  Every effort was made to ensure accuracy; however, inadvertent computerized transcription errors may be present        Reyes Nearing, DO  11/12/20 2021

## 2020-11-12 NOTE — ED NOTES
Bed: 23  Expected date:   Expected time:   Means of arrival:   Comments:  EMT     Zayda Sylvester, RN  11/12/20 6491

## 2020-11-13 PROBLEM — N18.30 STAGE 3 CHRONIC KIDNEY DISEASE (HCC): Status: ACTIVE | Noted: 2020-11-13

## 2020-11-13 PROBLEM — E78.5 HLD (HYPERLIPIDEMIA): Status: ACTIVE | Noted: 2020-11-13

## 2020-11-13 PROBLEM — R47.1 DYSARTHRIA DUE TO ACUTE CEREBELLAR STROKE (HCC): Status: ACTIVE | Noted: 2020-11-13

## 2020-11-13 PROBLEM — I10 ESSENTIAL HYPERTENSION: Status: ACTIVE | Noted: 2020-11-13

## 2020-11-13 PROBLEM — I25.10 CAD (CORONARY ARTERY DISEASE): Status: ACTIVE | Noted: 2020-11-13

## 2020-11-13 PROBLEM — I63.9 DYSARTHRIA DUE TO ACUTE CEREBELLAR STROKE (HCC): Status: ACTIVE | Noted: 2020-11-13

## 2020-11-13 PROBLEM — R41.82 ALTERED MENTAL STATUS: Status: ACTIVE | Noted: 2020-11-13
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
## 2020-11-13 PROBLEM — F17.200 TOBACCO DEPENDENCE: Status: ACTIVE | Noted: 2020-11-13

## 2020-11-13 PROBLEM — R53.1 ACUTE RIGHT-SIDED WEAKNESS: Status: ACTIVE | Noted: 2020-11-13

## 2020-11-13 PROBLEM — M06.9 RHEUMATOID ARTHRITIS (HCC): Status: ACTIVE | Noted: 2020-11-13

## 2020-11-13 PROBLEM — I63.512 ACUTE ISCHEMIC CEREBROVASCULAR ACCIDENT (CVA) INVOLVING LEFT MIDDLE CEREBRAL ARTERY TERRITORY (HCC): Status: ACTIVE | Noted: 2020-11-12

## 2020-11-13 LAB
ALBUMIN SERPL-MCNC: 3.6 G/DL (ref 3.5–5.2)
ALP BLD-CCNC: 69 U/L (ref 40–129)
ALT SERPL-CCNC: 12 U/L (ref 0–40)
ANION GAP SERPL CALCULATED.3IONS-SCNC: 11 MMOL/L (ref 7–16)
AST SERPL-CCNC: 17 U/L (ref 0–39)
BILIRUB SERPL-MCNC: 0.6 MG/DL (ref 0–1.2)
BUN BLDV-MCNC: 20 MG/DL (ref 8–23)
CALCIUM SERPL-MCNC: 9.1 MG/DL (ref 8.6–10.2)
CHLORIDE BLD-SCNC: 108 MMOL/L (ref 98–107)
CO2: 23 MMOL/L (ref 22–29)
CREAT SERPL-MCNC: 1.1 MG/DL (ref 0.7–1.2)
GFR AFRICAN AMERICAN: >60
GFR NON-AFRICAN AMERICAN: >60 ML/MIN/1.73
GLUCOSE BLD-MCNC: 96 MG/DL (ref 74–99)
HCT VFR BLD CALC: 40.2 % (ref 37–54)
HEMOGLOBIN: 13.8 G/DL (ref 12.5–16.5)
INR BLD: 1.2
MCH RBC QN AUTO: 32.5 PG (ref 26–35)
MCHC RBC AUTO-ENTMCNC: 34.3 % (ref 32–34.5)
MCV RBC AUTO: 94.6 FL (ref 80–99.9)
METER GLUCOSE: 94 MG/DL (ref 74–99)
PDW BLD-RTO: 13.7 FL (ref 11.5–15)
PLATELET # BLD: 269 E9/L (ref 130–450)
PMV BLD AUTO: 10.1 FL (ref 7–12)
POTASSIUM SERPL-SCNC: 4.1 MMOL/L (ref 3.5–5)
PROTHROMBIN TIME: 13.3 SEC (ref 9.3–12.4)
RBC # BLD: 4.25 E12/L (ref 3.8–5.8)
SODIUM BLD-SCNC: 142 MMOL/L (ref 132–146)
TOTAL CK: 161 U/L (ref 20–200)
TOTAL PROTEIN: 6 G/DL (ref 6.4–8.3)
WBC # BLD: 10.8 E9/L (ref 4.5–11.5)

## 2020-11-13 PROCEDURE — 80053 COMPREHEN METABOLIC PANEL: CPT

## 2020-11-13 PROCEDURE — 97166 OT EVAL MOD COMPLEX 45 MIN: CPT

## 2020-11-13 PROCEDURE — 2060000000 HC ICU INTERMEDIATE R&B

## 2020-11-13 PROCEDURE — 97530 THERAPEUTIC ACTIVITIES: CPT

## 2020-11-13 PROCEDURE — 85027 COMPLETE CBC AUTOMATED: CPT

## 2020-11-13 PROCEDURE — 92610 EVALUATE SWALLOWING FUNCTION: CPT

## 2020-11-13 PROCEDURE — 97161 PT EVAL LOW COMPLEX 20 MIN: CPT

## 2020-11-13 PROCEDURE — 6370000000 HC RX 637 (ALT 250 FOR IP): Performed by: INTERNAL MEDICINE

## 2020-11-13 PROCEDURE — 85610 PROTHROMBIN TIME: CPT

## 2020-11-13 PROCEDURE — 36415 COLL VENOUS BLD VENIPUNCTURE: CPT

## 2020-11-13 PROCEDURE — 2580000003 HC RX 258: Performed by: FAMILY MEDICINE

## 2020-11-13 PROCEDURE — 97535 SELF CARE MNGMENT TRAINING: CPT

## 2020-11-13 PROCEDURE — 82550 ASSAY OF CK (CPK): CPT

## 2020-11-13 PROCEDURE — 82962 GLUCOSE BLOOD TEST: CPT

## 2020-11-13 PROCEDURE — 2580000003 HC RX 258: Performed by: INTERNAL MEDICINE

## 2020-11-13 RX ORDER — ASPIRIN 300 MG/1
300 SUPPOSITORY RECTAL DAILY
Status: DISCONTINUED | OUTPATIENT
Start: 2020-11-13 | End: 2020-11-20 | Stop reason: HOSPADM

## 2020-11-13 RX ORDER — CLOPIDOGREL BISULFATE 75 MG/1
75 TABLET ORAL DAILY
Status: DISCONTINUED | OUTPATIENT
Start: 2020-11-13 | End: 2020-11-20 | Stop reason: HOSPADM

## 2020-11-13 RX ORDER — SODIUM CHLORIDE 9 MG/ML
INJECTION, SOLUTION INTRAVENOUS CONTINUOUS
Status: ACTIVE | OUTPATIENT
Start: 2020-11-13 | End: 2020-11-13

## 2020-11-13 RX ADMIN — ASPIRIN 81 MG CHEWABLE TABLET 81 MG: 81 TABLET CHEWABLE at 08:33

## 2020-11-13 RX ADMIN — SODIUM CHLORIDE, PRESERVATIVE FREE 10 ML: 5 INJECTION INTRAVENOUS at 02:03

## 2020-11-13 RX ADMIN — CLOPIDOGREL 75 MG: 75 TABLET, FILM COATED ORAL at 17:53

## 2020-11-13 RX ADMIN — SODIUM CHLORIDE: 9 INJECTION, SOLUTION INTRAVENOUS at 02:03

## 2020-11-13 RX ADMIN — ACETAMINOPHEN 650 MG: 325 TABLET ORAL at 08:38

## 2020-11-13 ASSESSMENT — PAIN SCALES - GENERAL
PAINLEVEL_OUTOF10: 0
PAINLEVEL_OUTOF10: 6
PAINLEVEL_OUTOF10: 0

## 2020-11-13 NOTE — PLAN OF CARE
Problem: Falls - Risk of:  Goal: Will remain free from falls  Description: Will remain free from falls  Outcome: Met This Shift  Goal: Absence of physical injury  Description: Absence of physical injury  Outcome: Met This Shift     Problem: HEMODYNAMIC STATUS  Goal: Patient has stable vital signs and fluid balance  Outcome: Met This Shift     Problem: ACTIVITY INTOLERANCE/IMPAIRED MOBILITY  Goal: Mobility/activity is maintained at optimum level for patient  Outcome: Met This Shift     Problem: COMMUNICATION IMPAIRMENT  Goal: Ability to express needs and understand communication  Outcome: Met This Shift

## 2020-11-13 NOTE — PLAN OF CARE
Problem: HEMODYNAMIC STATUS  Goal: Patient has stable vital signs and fluid balance  11/13/2020 0950 by Mile Duke RN  Outcome: Met This Shift  11/13/2020 0233 by Anthony Bess RN  Outcome: Met This Shift     Problem: ACTIVITY INTOLERANCE/IMPAIRED MOBILITY  Goal: Mobility/activity is maintained at optimum level for patient  11/13/2020 0950 by Mile Duke RN  Outcome: Met This Shift  11/13/2020 0233 by Anthony Bess RN  Outcome: Met This Shift     Problem: COMMUNICATION IMPAIRMENT  Goal: Ability to express needs and understand communication  11/13/2020 0950 by Mile Duke RN  Outcome: Met This Shift  11/13/2020 0233 by Anthony Bess RN  Outcome: Met This Shift

## 2020-11-13 NOTE — H&P
Hyperlipidemia        Past Surgical History:          Procedure Laterality Date    FRACTURE SURGERY         Medications Prior to Admission:      Prior to Admission medications    Medication Sig Start Date End Date Taking? Authorizing Provider   albuterol sulfate  (90 Base) MCG/ACT inhaler Inhale 2 puffs into the lungs every 6 hours as needed for Wheezing   Yes Historical Provider, MD   methotrexate (RHEUMATREX) 2.5 MG chemo tablet Take 12.5 mg by mouth once a week Sunday   Yes Historical Provider, MD   predniSONE (DELTASONE) 20 MG tablet Take 20 mg by mouth daily as needed (Arthritis Pain)    Yes Historical Provider, MD   simvastatin (ZOCOR) 10 MG tablet Take 10 mg by mouth nightly   Yes Historical Provider, MD       Allergies:  Patient has no known allergies. Social History:      The patient currently lives at home alone. TOBACCO:   reports that he quit smoking about 4 years ago. His smoking use included cigarettes. He has never used smokeless tobacco.  ETOH:   reports current alcohol use. Unknown history. Unknown drug history. Family History:     Reviewed in detail Positive as follows: Unknown, unable to obtain. REVIEW OF SYSTEMS:   Pertinent positives as noted in the HPI. Unable to obtain due to altered mental status. PHYSICAL EXAM:    /73   Pulse 85   Temp 98.3 °F (36.8 °C) (Temporal)   Resp 20   Ht 5' 11\" (1.803 m)   SpO2 97%   BMI 27.89 kg/m²     General appearance: Elderly male, lethargic, no apparent distress, appears stated age and afebrile. Chelsy Mar HEENT:  Normal cephalic, atraumatic with right facial droop. Pupils equal, round, and reactive to light. Conjunctivae/corneas clear. Neck: Supple, with limited range of motion. No jugular venous distention. Trachea midline. Respiratory:  Normal respiratory effort. Clear to auscultation, bilaterally without Rales/Wheezes/Rhonchi.   Cardiovascular:  Regular rate and rhythm with normal S1/S2 without murmurs, rubs or gallops. Abdomen: Soft, non-tender, non-distended with normal bowel sounds. Musculoskeletal:  No clubbing, cyanosis or edema bilaterally. Limited range of motion without deformity. Skin: Skin color, texture, turgor normal.  No rashes or lesions. Neurologic: Extremely lethargic, arousable but goes back to sleep, right facial asymmetry and right-sided weakness. Unable to fully assess strength due to altered mental status. Psychiatric: Extremely lethargic and disoriented, thought content inappropriate, impaired insight  Capillary Refill: Brisk,< 3 seconds   Peripheral Pulses: +2 palpable, equal bilaterally       Labs: Reviewed and documented above    No results for input(s): WBC, HGB, HCT, PLT in the last 72 hours. Recent Labs     11/12/20  1859   PHOS 2.9     No results for input(s): AST, ALT, BILIDIR, BILITOT, ALKPHOS in the last 72 hours. No results for input(s): INR in the last 72 hours. No results for input(s): Margette Dec in the last 72 hours. Urinalysis:    No results found for: Mcdonald Harada, BACTERIA, RBCUA, BLOODU, [address]    Radiology:   Reviewed and documented above    MRI BRAIN WO CONTRAST   Final Result   Acute subacute left MCA distribution infarct. Negative for hemorrhagic   transformation. ASSESSMENT:    Active Hospital Problems    Diagnosis Date Noted    HLD (hyperlipidemia) [E78.5] 11/13/2020    Acute right-sided weakness [R53.1] 11/13/2020    Rheumatoid arthritis ([facility] [address] [M06.9] 11/13/2020    Fall [W10. XXXA] 11/13/2020    Altered mental status [R41.82] 11/13/2020    Essential hypertension [I10] 11/13/2020    CAD (coronary artery disease) [I25.10] 11/13/2020    Tobacco dependence [F17.200] 11/13/2020    Stage 3 chronic kidney disease [N18.30] 11/13/2020    Dysarthria due to acute cerebellar stroke ([facility] [address] [I63.9, R47.1] 11/13/2020    Acute ischemic cerebrovascular accident (CVA) involving left middle cerebral artery territory [facility]) [A48.228] 11/12/2020         PLAN:  1. Acute left MCA CVA. Patient was not a TPA candidate at the referring facility due to being out of TPA window. CTA of the head and neck is pending formal radiology report. MRI confirms stroke. Neurology consult. Aspirin and statin. N.p.o. until mental status improves. Obtain echo. Monitor blood sugar. 2.  Altered mental status secondary to stroke. Neurochecks. 3.  Fall secondary to stroke. 4.  Acute right-sided weakness secondary to stroke. Physical therapy  5. Dysarthria/aphasia, speech therapy. 6.  Hyperlipidemia, statin, check lipid panel. 7.  Rheumatoid arthritis on methotrexate and as needed steroid  8. Coronary artery disease with . Limited details. 9.  COPD with chronic bronchitis, breathing treatment as needed  10. Hypertension, blood pressure stable. 11.  Stage III chronic kidney disease  12. Tobacco dependence. Smoking cessation    DVT Prophylaxis: SCDs. Anticoagulation can be started in the morning. Diet: Diet NPO Effective Now  Code Status: Full Code    PT/OT Eval Status: Evaluation and treatment    Dispo -inpatient/telemetry       Nathen Meade MD    Thank you Rashawn Kelley DO for the opportunity to be involved in this patient's care.

## 2020-11-13 NOTE — PROGRESS NOTES
Subjective:    Chief complaint:    Seen this morning  Still having significant issues with weakness on the right side  Daughter is by the bedside    Objective:    /71   Pulse 67   Temp 98.1 °F (36.7 °C) (Temporal)   Resp 18   Ht 5' 11\" (1.803 m)   Wt 183 lb 9.6 oz (83.3 kg)   SpO2 96%   BMI 25.61 kg/m²   General : Awake ,alert,no distress. Heart:  RRR, no murmurs, gallops, or rubs. Lungs:  CTA bilaterally, no wheeze, rales or rhonchi  Abd: bowel sounds present, nontender, nondistended, no masses  Extrem:  No clubbing, cyanosis, or edema    CBC:   Lab Results   Component Value Date    WBC 10.8 11/13/2020    RBC 4.25 11/13/2020    HGB 13.8 11/13/2020    HCT 40.2 11/13/2020    MCV 94.6 11/13/2020    MCH 32.5 11/13/2020    MCHC 34.3 11/13/2020    RDW 13.7 11/13/2020     11/13/2020    MPV 10.1 11/13/2020     BMP:    Lab Results   Component Value Date     11/13/2020    K 4.1 11/13/2020    K 4.2 06/18/2019     11/13/2020    CO2 23 11/13/2020    BUN 20 11/13/2020    LABALBU 3.6 11/13/2020    CREATININE 1.1 11/13/2020    CALCIUM 9.1 11/13/2020    GFRAA >60 11/13/2020    LABGLOM >60 11/13/2020    GLUCOSE 96 11/13/2020     PT/INR:    Lab Results   Component Value Date    PROTIME 13.3 11/13/2020    INR 1.2 11/13/2020     Troponin:    Lab Results   Component Value Date    TROPONINI <0.01 06/18/2019       No results for input(s): LABURIN in the last 72 hours. No results for input(s): BC in the last 72 hours. No results for input(s): Janet Dorsey in the last 72 hours.       Current Facility-Administered Medications:     aspirin suppository 300 mg, 300 mg, Rectal, Daily, Joi Carrizales MD    clopidogrel (PLAVIX) tablet 75 mg, 75 mg, Oral, Daily, Jack Goss MD    perflutren lipid microspheres (DEFINITY) injection 1.65 mg, 1.5 mL, Intravenous, ONCE PRN, Gweneth Class, DO    sodium chloride flush 0.9 % injection 10 mL, 10 mL, Intravenous, 2 times per day, Gweneth Class, DO, 10 mL at 11/13/20 0203    sodium chloride flush 0.9 % injection 10 mL, 10 mL, Intravenous, PRN, Joseph Hicks DO    acetaminophen (TYLENOL) tablet 650 mg, 650 mg, Oral, Q6H PRN, 650 mg at 11/13/20 2699 **OR** acetaminophen (TYLENOL) suppository 650 mg, 650 mg, Rectal, Q6H PRN, Joseph Hicks DO    polyethylene glycol (GLYCOLAX) packet 17 g, 17 g, Oral, Daily PRN, Vera Sims DO    atorvastatin (LIPITOR) tablet 80 mg, 80 mg, Oral, Nightly, Vera Sims DO    aspirin chewable tablet 81 mg, 81 mg, Oral, Daily, Carmen Sims DO, 81 mg at 11/13/20 0833    ipratropium-albuterol (DUONEB) nebulizer solution 1 ampule, 1 ampule, Inhalation, Q4H PRN, Evita Falk MD    Diet NPO Effective Now    MRI BRAIN WO CONTRAST   Final Result   Acute subacute left MCA distribution infarct. Negative for hemorrhagic   transformation. Assessment:    Active Problems:    Acute ischemic cerebrovascular accident (CVA) involving left middle cerebral artery territory ([address]    HLD (hyperlipidemia)    Acute right-sided weakness    Rheumatoid arthritis ([address]    Fall    Altered mental status    Essential hypertension    CAD (coronary artery disease)    Tobacco dependence    Stage 3 chronic kidney disease    Dysarthria due to acute cerebellar stroke [facility])  Resolved Problems:    * No resolved hospital problems. *      Plan:    MRI revealed stroke  Discussed in detail with daughter  Await neurology evaluation        Sam Damon  4:59 PM  11/13/2020    NOTE: This report was transcribed using voice recognition software.  Every effort was made to ensure accuracy; however, inadvertent transcription errors may be present

## 2020-11-13 NOTE — CARE COORDINATION
Transition of care: The patient was admitted due to having a left sided stroke with Rt sided weakness with aphasia and facial droop. He was found on the floor by his good friends of his. I spoke to his daughter Nikki Alcantar @ [phone] and she told me that her father lives alone in a 1 story home with 3-4 steps. He was driving and mowing the lawn the other day . He has no Dme and his Pharmacy is [facility] . He has no pmh of Home Health care or suzi. Pt Ot ordered. I spoke to  Clara Orona about acute rehab and she wants [facility] as its closer to her Ref made to [facility].  I will follow

## 2020-11-13 NOTE — PROGRESS NOTES
SPEECH/LANGUAGE PATHOLOGY  BEDSIDE SWALLOWING EVALUATION    PATIENT NAME:  Itzel Neff      :  1945          TODAY'S DATE:  2020 ROOM:  [facility]8509B      SUMMARY OF EVALUATION     DYSPHAGIA DIAGNOSIS:  Unable to rule out aspiration at bedside      DIET RECOMMENDATIONS:  NPO (nothing by mouth including oral meds)       FEEDING RECOMMENDATIONS:     Assistance level:  Not applicable      Compensatory strategies recommended: Not applicable    THERAPY RECOMMENDATIONS:      A Video Swallow Study (MBSS) is recommended and requires a physician order                   PROCEDURE     Consistencies Administered During the Evaluation   Liquids: thin liquid    Solids:  Not administered    Method of Intake:   straw, spoon  Fed by clinician      Position:   Seated, upright                  RESULTS     Oral Stage:         Delayed A-P transit due to: reduced lingual strength       Pharyngeal Stage:      Latent wet cough was noted after presentation of thin liquid                    CPT code:  33498  bedside swallow eval      [x]The admitting diagnosis and active problem list, as listed below have been reviewed prior to initiation of this evaluation.      ADMITTING DIAGNOSIS: Acute CVA (cerebrovascular accident) ([address] [I63.9]  Acute CVA (cerebrovascular accident) ([address] [I63.9]  Acute CVA (cerebrovascular accident) ([address] [I63.9]     ACTIVE PROBLEM LIST:   Patient Active Problem List   Diagnosis    Perforated viscus    Diverticulitis of large intestine without bleeding    Acute ischemic cerebrovascular accident (CVA) involving left middle cerebral artery territory ([address]    HLD (hyperlipidemia)    Acute right-sided weakness    Rheumatoid arthritis ([address]    Fall    Altered mental status    Essential hypertension    CAD (coronary artery disease)    Tobacco dependence    Stage 3 chronic kidney disease    Dysarthria due to acute cerebellar stroke ([address]

## 2020-11-13 NOTE — CONSULTS
Jennifer Warren is a 67 y.o. male with PMH of hypertension, hyperlipidemia, CKD, rheumatoid arthritis on methotrexate, COPD, who was transferred from [facility] after patient had a stroke, with dysarthria and right-sided weakness, was brought in by the family to the ED. Patient stays alone and was physically active before. He is unable to give any history, but wakes up on verbal stimuli and goes back to sleep, following commands, MRI brain showed left MCA distribution stroke, neurology was consulted for the stroke evaluation, he was out of window for tPA at the presentation. Chief Complaint   Patient presents with    Cerebrovascular Accident     CVA transfer from [place] with right sided weakness, aphasia, and droop. Last seen normal some time yesterday. -thinners 300mg ASA suppository 500mL NS, and 4mg Zofran PTA         Prior to Visit Medications    Medication Sig Taking? Authorizing Provider   albuterol sulfate  (90 Base) MCG/ACT inhaler Inhale 2 puffs into the lungs every 6 hours as needed for Wheezing Yes Historical Provider, MD   methotrexate (RHEUMATREX) 2.5 MG chemo tablet Take 12.5 mg by mouth once a week  Yes Historical Provider, MD   predniSONE (DELTASONE) 20 MG tablet Take 20 mg by mouth daily as needed ([facility])  Yes Historical Provider, MD   simvastatin (ZOCOR) 10 MG tablet Take 10 mg by mouth nightly Yes Historical Provider, MD     Social History     Tobacco Use    Smoking status: Former Smoker     Types: Cigarettes     Last attempt to quit: 2016     Years since quittin.5    Smokeless tobacco: Never Used   Substance Use Topics    Alcohol use: Yes     Comment: rare    Drug use: Never     No family history on file. Past Surgical History:   Procedure Laterality Date    FRACTURE SURGERY       Past Medical History:   Diagnosis Date    Arthritis     Hyperlipidemia      Review of Systems   Neurological: Positive for weakness.        Could not be CALCIUM 9.1 11/13/2020    GFRAA >60 11/13/2020    LABGLOM >60 11/13/2020    GLUCOSE 96 11/13/2020         MRI brain:  Impression    Acute subacute left MCA distribution infarct.  Negative for hemorrhagic    transformation. I independently reviewed the labs and imaging studies at today's appointment.      Assessment:     Altered mental status 2/2 stroke  Left MCA stroke  Right hemiparesis with dysarthria  COPD  Hypertension  Diabetes    Plan:     continue aspirin and statin   Continue physical therapy  Will benefit from acute rehab  Follow echo  Will add plavix theraphy    Rafia Nassar  1:12 PM  11/13/2020

## 2020-11-13 NOTE — PROGRESS NOTES
OCCUPATIONAL THERAPY INITIAL EVALUATION      Date:2020  Patient Name: Destiney Landers  MRN: [id number]  : 1945  Room: [address] [place] [address]    Referring Provider: David Rojas DO    Evaluating OT: Seema Richardson [place] #[id number]    AM-PAC Daily Activity Raw Score:     Modified Kaden Scale   Score     Description  0             No symptoms  1             No significant disability despite symptoms  2             Slight disability; able to look after own affairs  3             Moderate disability; able to ambulate without assist/ requires assist with ADLs  4             Moderate/Severe disability;requires assist to ambulate/assist with ADLs  5             Severe disability;bedridden/incontinent   6               Score:   5    Recommended Adaptive Equipment/DME: To be further assessed      Diagnosis:    1. Cerebrovascular accident (CVA), unspecified mechanism ([facility] [address]       Pertinent Medical History: arthritis, hyperlipidemia     Precautions:  Falls, aphasia (expressive/receptive), R hemiplegia, bed alarm, cast to L wrist d/t surgery with pinning of wrist on 11/3/20 (per daughter)- NWB RUE until clarified by ortho. Home Living: per daughter: Pt lives alone in a 1 story with 3 step(s) to enter and 1 rail(s); bed/bath on 1st   Bathroom setup: tub shower combo  Equipment owned: none    Prior Level of Function: pt was non verbal d/t aphasia, daughter stated pt was Independent  with ADLs , Independent  with IADLs; using no AD for ambulation. Very active around the house  Driving: yes       Pain Level: none noted  Cognition: A&O: 0/4;  Unable to follow any verbal directions.  Improved follow through with hand over hand and demonstrated cues  Follows 1 step directions: poor   Memory:  NT   Sequencing:  poor   Problem solving:  poor   Judgement/safety:  poor     Functional Assessment:   Initial Eval Status  Date: 20 Treatment Status  Date: STG=LTG  Time frame: 5-7 days   Feeding Currently NPO  Max comprehension of verbal commands,  decreased  independence with  ADLs,  bed mobility,functional transfers, functional mobility . At end of session, patient in bed with call light and phone within reach, all lines and tubes intact. Pt would benefit from continued skilled OT to increase independence with cognition,  ADLs, functional mobility,  safety,  and quality of life. Treatment: OT treatment provided this date includes:    ADL-  Instruction/training on safety and adapted techniques for completion of ADLs: oral hygiene and grooming of hair sitting EOB.   Mobility-  Instruction/training on safety and improved independence with bed mobility/functional transfers sit to stand with R knee blocked to prevent buckle during transfer.   Sitting EOB x 17 minutes to improve dynamic sitting balance and activity tolerance during ADLs.    Neuromuscular Reeducation to facilitate balance/righting reactions sitting EOB for increased function with ADLs tasks: pt listing to right but able to self correct with facilitation.   Neuromuscular Facilitation of R UE functional movement/ROM with tapping and other     Visual/Perception-Facilitation of Visual Perceptual Skills for increased safety and independence with ADLs.      Skilled positioning/alignment-  Proper Positioning/Alignment sitting EOB and supine in bed with RUE on pillow to improve visual awareness/proprioception     Assessment of current deficits   Functional mobility [x]  ADLs [x] Strength [x]  Cognition [x]  Functional transfers  [x] IADLs [] Safety Awareness [x]  Endurance [x]  Fine Motor Coordination [x] Balance [x] Vision/perception [x] Sensation []   Gross Motor Coordination [x] ROM [x] Delirium []                  Motor Control []    Plan of Care: 2-5 days/wk for 1-2 weeks PRN   [x]ADL retraining: adapted techniques and AE recommendations to increase independence within precautions                    [x]Energy conservation techniques to improve tolerance for self care routine   [x]Functional transfer/mobility training for fall prevention & DME recommendations         [x]Patient/family education to increase safety and functional independence             [x]Environmental modifications for safe mobility and completion of ADLs                             [x]Cognitive retraining ex's to improve problem solving skills & safe participation in ADLs/IADLs     [x]Sensory re-education techniques to improve extremity awareness, maintain skin integrity and improve hand function                             [x]Visual/Perceptual retraining ex's to improve body awareness and safety during transfers and ADLs  [x]Splinting/postioning needs to maintain joint/skin integrity and prevent contractures  [x]Therapeutic activity to improve functional skills. [x]Therapeutic exercise to improve tolerance for ADLs; [facility] skills  [x]Balance retraining ex's for postural control during ADLs with dynamic challenges. [x]Neuromuscular re-education: facilitation of righting/equilibrium reactions, midline orientation, scapular stability/mobility, Normalization muscle tone/active functional    movement/Attention                         []Delirium prevention/treatment    [x]Positioning to improve functional independence and skin integrity  []Other:     Rehab Potential: Good for established goals     Patient / Family Goal:  not stated     Patient and/or family were instructed on functional diagnosis, prognosis/goals and OT plan of care. Demonstrated fair understanding. · Mod Complexity  · History: Expanded review of medical records and additional review of physical, cognitive, or psychosocial history related to current functional performance  · Exam: 5+ performance deficits  · Assistance/Modification: mod/max assistance or modifications required to perform tasks. May have comorbidities that affect occupational performance.   mod Evaluation complexity completed +     Time In: 10:19am  Time Out: 10:47am  Total Treatment Time: 25 minutes    Min Units   OT Eval Low 34561       OT Eval Medium 69321  x    OT Eval High 80542       OT Re-Eval [id number]       Therapeutic Ex 05655       Therapeutic Activities 77503  15  1   ADL/Self Care 40390  10  1   Orthotic Management 91559       Neuro Re-Ed 59695       Total Treatment Time 25 2       Evaluation time includes thorough review of current medical information, gathering information on past medical history/social history and prior level of function, completion of standardized testing/informal observation of tasks, assessment of data, and development of POC/Goals    Ivone Ortega, OTR/L #[id number]

## 2020-11-13 NOTE — PROGRESS NOTES
Spoke with Hermelinda from [facility] radiology regarding MRI results. Dr. Sebastián Salazar notified via Perfect serve.

## 2020-11-13 NOTE — PROGRESS NOTES
Physical Therapy  Physical Therapy Initial Assessment     Name: Jeremy Holbrook  : 1945  MRN: [id number]    Referring Provider:  Elise Owens DO    Date of Service: 2020    Evaluating PT:  Rowena Santillan PT, DPT PT [id number]    Room #:  9321/5745-X  Diagnosis:  L MCA CVA  PMHx/PSHx:  Arthritis, HLD  Procedure/Surgery:    Precautions:  R Hemiparesis, Falls, Aphasia, L Wrist Fx 11/3/2020   Equipment Needs:  TBD    SUBJECTIVE:    Pt lives alone in a 1 story home with 3 stairs to enter and single rail. Bed is on first floor and bath is on first floor. Pt ambulated with no device independently PTA. Pt independent and very active PTA. Equipment Owned:  None    OBJECTIVE:   Initial Evaluation  Date: 2020 Treatment Short Term/ Long Term   Goals   AM-PAC 6 Clicks 50/49     Was pt agreeable to Eval/treatment? Yes     Does pt have pain? No c/o pain (no facial grimace noted)     Bed Mobility  Rolling: Jessie  Supine to sit: ModA  Sit to supine: Nt  Scooting: ModA  Rolling: Independent  Supine to sit: Independent  Sit to supine: Independent  Scooting: Independent     Transfers Sit to stand: ModA x 2  Stand to sit: ModA x 2  Stand pivot: MaxA x 2 no AD   Sit to stand: Jessie    Stand to sit: Jessie    Stand pivot: Jessie AAD   Ambulation    3 feet with MaxA x 2 HHA  >25 feet with Jessie  AAD   Stair negotiation: ascended and descended  NT**     ROM BUE:  See OT Note  BLE:  WFL     Strength BUE:  See OT Note  RLE: 2/5 hip and knee  No ankle AROM observed  LLE: 3/5  Improve Strength 1/3 MMT Grade   Balance Sitting EOB:  Jessie  Dynamic Standing:  MaxA x 2 HHA  Sitting EOB:  Independent    Dynamic Standing:  Jessie AAD     Pt is A & O x (self). Pt aphasic with garbled speech.  Pt following 75% of simple commands with proper cueing  Sensation:  Unable to assess accurately due to aphasia  Edema:  WNL      Therapeutic Exercises:  Seated marching 1x 15    Patient education  Pt educated on role of PT    Patient response to education: Pt verbalized understanding Pt demonstrated skill Pt requires further education in this area   x x x     ASSESSMENT:    Comments:  Pt received in supine agreeable to PT evaluation. Pt aphasic following 75% of simple commands with proper cueing. Pt requiring assistance of trunk for bed mobility. Pt sitting statically with intermittent steadying assistance. Pt tolerating ~ 10 min EOB. Assisted with hygiene, as pt noted to be incontinent of urine and stool. Pt performed functional transfer with R knee block. Pt requiring assistance to advance RLE and intermittent knee block when in single leg stance during ambulation from bed to chair. Pt required assistance for eccentric control for stand to sit. Pts daughter present in room, stating she will supervise pt in bedside chair. RN updated. Patient would benefit from continued skilled PT to maximize functional mobility independence. Patient would benefit from PM&R consult. Treatment:  Patient practiced and was instructed in the following treatment:     Bed mobility- verbal cues to facilitate independence   Functional transfers-Verbal cues for proper positioning and sequencing to perform transfers safely with maximum independence.  Gait training-Verbal cues for proper positioning and sequencing  to maximize functional mobility independence. Pt's/ family goals   1. Get better    Patient and or family understand(s) diagnosis, prognosis, and plan of care. yes    PLAN:      PLAN OF CARE:    Current Treatment Recommendations     [x] Strengthening     [x] ROM   [x] Balance Training   [x] Endurance Training   [x] Transfer Training   [x] Gait Training   [] Stair Training   [x] Positioning   [x] Safety and Education Training   [x] Patient/Caregiver Education    [x] HEP  [] Other       PT care will be provided in accordance with the objectives noted above.  Exercises and functional mobility practice will be used as well as appropriate assistive devices or modalities

## 2020-11-14 ENCOUNTER — APPOINTMENT (OUTPATIENT)
Dept: CT IMAGING | Age: 75
DRG: 065 | End: 2020-11-14
Payer: MEDICARE

## 2020-11-14 ENCOUNTER — APPOINTMENT (OUTPATIENT)
Dept: GENERAL RADIOLOGY | Age: 75
DRG: 065 | End: 2020-11-14
Payer: MEDICARE

## 2020-11-14 LAB
ALBUMIN SERPL-MCNC: 3.3 G/DL (ref 3.5–5.2)
ALP BLD-CCNC: 73 U/L (ref 40–129)
ALT SERPL-CCNC: 11 U/L (ref 0–40)
ANION GAP SERPL CALCULATED.3IONS-SCNC: 12 MMOL/L (ref 7–16)
AST SERPL-CCNC: 16 U/L (ref 0–39)
BILIRUB SERPL-MCNC: 0.8 MG/DL (ref 0–1.2)
BUN BLDV-MCNC: 18 MG/DL (ref 8–23)
CALCIUM SERPL-MCNC: 8.8 MG/DL (ref 8.6–10.2)
CHLORIDE BLD-SCNC: 104 MMOL/L (ref 98–107)
CO2: 22 MMOL/L (ref 22–29)
CREAT SERPL-MCNC: 1 MG/DL (ref 0.7–1.2)
EKG ATRIAL RATE: 77 BPM
EKG ATRIAL RATE: 78 BPM
EKG P AXIS: 55 DEGREES
EKG P AXIS: 75 DEGREES
EKG P-R INTERVAL: 134 MS
EKG P-R INTERVAL: 144 MS
EKG Q-T INTERVAL: 374 MS
EKG Q-T INTERVAL: 378 MS
EKG QRS DURATION: 84 MS
EKG QRS DURATION: 90 MS
EKG QTC CALCULATION (BAZETT): 426 MS
EKG QTC CALCULATION (BAZETT): 427 MS
EKG R AXIS: -19 DEGREES
EKG T AXIS: 50 DEGREES
EKG T AXIS: 83 DEGREES
EKG VENTRICULAR RATE: 77 BPM
EKG VENTRICULAR RATE: 78 BPM
GFR AFRICAN AMERICAN: >60
GFR NON-AFRICAN AMERICAN: >60 ML/MIN/1.73
GLUCOSE BLD-MCNC: 87 MG/DL (ref 74–99)
HCT VFR BLD CALC: 41.4 % (ref 37–54)
HEMOGLOBIN: 13.6 G/DL (ref 12.5–16.5)
MCH RBC QN AUTO: 31.5 PG (ref 26–35)
MCHC RBC AUTO-ENTMCNC: 32.9 % (ref 32–34.5)
MCV RBC AUTO: 95.8 FL (ref 80–99.9)
METER GLUCOSE: 127 MG/DL (ref 74–99)
METER GLUCOSE: 78 MG/DL (ref 74–99)
METER GLUCOSE: 91 MG/DL (ref 74–99)
PDW BLD-RTO: 13.3 FL (ref 11.5–15)
PLATELET # BLD: 253 E9/L (ref 130–450)
PMV BLD AUTO: 10.3 FL (ref 7–12)
POTASSIUM SERPL-SCNC: 3.9 MMOL/L (ref 3.5–5)
RBC # BLD: 4.32 E12/L (ref 3.8–5.8)
SODIUM BLD-SCNC: 138 MMOL/L (ref 132–146)
TOTAL PROTEIN: 6.1 G/DL (ref 6.4–8.3)
WBC # BLD: 11.9 E9/L (ref 4.5–11.5)

## 2020-11-14 PROCEDURE — 82962 GLUCOSE BLOOD TEST: CPT

## 2020-11-14 PROCEDURE — 2580000003 HC RX 258: Performed by: RADIOLOGY

## 2020-11-14 PROCEDURE — 93010 ELECTROCARDIOGRAM REPORT: CPT | Performed by: INTERNAL MEDICINE

## 2020-11-14 PROCEDURE — 85027 COMPLETE CBC AUTOMATED: CPT

## 2020-11-14 PROCEDURE — 92526 ORAL FUNCTION THERAPY: CPT

## 2020-11-14 PROCEDURE — 2060000000 HC ICU INTERMEDIATE R&B

## 2020-11-14 PROCEDURE — 2580000003 HC RX 258: Performed by: INTERNAL MEDICINE

## 2020-11-14 PROCEDURE — 99232 SBSQ HOSP IP/OBS MODERATE 35: CPT | Performed by: PHYSICIAN ASSISTANT

## 2020-11-14 PROCEDURE — 6370000000 HC RX 637 (ALT 250 FOR IP): Performed by: INTERNAL MEDICINE

## 2020-11-14 PROCEDURE — 74230 X-RAY XM SWLNG FUNCJ C+: CPT

## 2020-11-14 PROCEDURE — 70450 CT HEAD/BRAIN W/O DYE: CPT

## 2020-11-14 PROCEDURE — 70498 CT ANGIOGRAPHY NECK: CPT

## 2020-11-14 PROCEDURE — 93005 ELECTROCARDIOGRAM TRACING: CPT | Performed by: INTERNAL MEDICINE

## 2020-11-14 PROCEDURE — 2500000003 HC RX 250 WO HCPCS: Performed by: RADIOLOGY

## 2020-11-14 PROCEDURE — 6370000000 HC RX 637 (ALT 250 FOR IP): Performed by: FAMILY MEDICINE

## 2020-11-14 PROCEDURE — 80053 COMPREHEN METABOLIC PANEL: CPT

## 2020-11-14 PROCEDURE — 70496 CT ANGIOGRAPHY HEAD: CPT

## 2020-11-14 PROCEDURE — 92611 MOTION FLUOROSCOPY/SWALLOW: CPT

## 2020-11-14 PROCEDURE — 36415 COLL VENOUS BLD VENIPUNCTURE: CPT

## 2020-11-14 PROCEDURE — 6360000004 HC RX CONTRAST MEDICATION: Performed by: RADIOLOGY

## 2020-11-14 RX ORDER — DEXTROSE AND SODIUM CHLORIDE 5; .9 G/100ML; G/100ML
INJECTION, SOLUTION INTRAVENOUS CONTINUOUS
Status: DISCONTINUED | OUTPATIENT
Start: 2020-11-14 | End: 2020-11-19

## 2020-11-14 RX ORDER — SODIUM CHLORIDE 0.9 % (FLUSH) 0.9 %
10 SYRINGE (ML) INJECTION PRN
Status: DISCONTINUED | OUTPATIENT
Start: 2020-11-14 | End: 2020-11-20 | Stop reason: HOSPADM

## 2020-11-14 RX ADMIN — SODIUM CHLORIDE, PRESERVATIVE FREE 10 ML: 5 INJECTION INTRAVENOUS at 10:12

## 2020-11-14 RX ADMIN — BARIUM SULFATE 15 ML: 400 SUSPENSION ORAL at 11:12

## 2020-11-14 RX ADMIN — ACETAMINOPHEN 650 MG: 650 SUPPOSITORY RECTAL at 09:53

## 2020-11-14 RX ADMIN — IOPAMIDOL 60 ML: 755 INJECTION, SOLUTION INTRAVENOUS at 14:53

## 2020-11-14 RX ADMIN — CLOPIDOGREL 75 MG: 75 TABLET, FILM COATED ORAL at 12:03

## 2020-11-14 RX ADMIN — ACETAMINOPHEN 650 MG: 325 TABLET ORAL at 01:35

## 2020-11-14 RX ADMIN — DEXTROSE AND SODIUM CHLORIDE: 5; 900 INJECTION, SOLUTION INTRAVENOUS at 15:38

## 2020-11-14 RX ADMIN — ASPIRIN 300 MG: 300 SUPPOSITORY RECTAL at 10:11

## 2020-11-14 RX ADMIN — BARIUM SULFATE 15 ML: 400 PASTE ORAL at 11:11

## 2020-11-14 RX ADMIN — Medication 10 ML: at 14:53

## 2020-11-14 RX ADMIN — ATORVASTATIN CALCIUM 80 MG: 80 TABLET, FILM COATED ORAL at 21:50

## 2020-11-14 ASSESSMENT — PAIN DESCRIPTION - LOCATION: LOCATION: ARM

## 2020-11-14 ASSESSMENT — PAIN SCALES - GENERAL
PAINLEVEL_OUTOF10: 5
PAINLEVEL_OUTOF10: 4
PAINLEVEL_OUTOF10: 0

## 2020-11-14 ASSESSMENT — PAIN DESCRIPTION - DESCRIPTORS: DESCRIPTORS: CONSTANT;DISCOMFORT

## 2020-11-14 NOTE — PLAN OF CARE
Problem: Falls - Risk of:  Goal: Will remain free from falls  Description: Will remain free from falls  Outcome: Met This Shift  Goal: Absence of physical injury  Description: Absence of physical injury  Outcome: Met This Shift     Problem: HEMODYNAMIC STATUS  Goal: Patient has stable vital signs and fluid balance  Outcome: Met This Shift

## 2020-11-14 NOTE — PROGRESS NOTES
5.3 06/18/2019    BASOPCT 0.3 06/18/2019    MONOSABS 0.86 06/18/2019    LYMPHSABS 1.21 06/18/2019    EOSABS 0.00 06/18/2019    BASOSABS 0.00 06/18/2019     CMP:    Lab Results   Component Value Date     11/14/2020    K 3.9 11/14/2020    K 4.2 06/18/2019     11/14/2020    CO2 22 11/14/2020    BUN 18 11/14/2020    CREATININE 1.0 11/14/2020    GFRAA >60 11/14/2020    LABGLOM >60 11/14/2020    GLUCOSE 87 11/14/2020    PROT 6.1 11/14/2020    LABALBU 3.3 11/14/2020    CALCIUM 8.8 11/14/2020    BILITOT 0.8 11/14/2020    ALKPHOS 73 11/14/2020    AST 16 11/14/2020    ALT 11 11/14/2020     HgBA1c:    Lab Results   Component Value Date    LABA1C 5.7 11/12/2020     FLP:    Lab Results   Component Value Date    TRIG 83 11/12/2020    HDL 45 11/12/2020    LDLCALC 80 11/12/2020    LABVLDL 17 11/12/2020     MRI brain- LMCA stroke     CTH-   1.  Slightly increased volume of, and mass effect related to, acute/subacute,   non-hemorrhagic infarction scattered about the mid/posterior aspect of the   left MCA territory, as described.  Please see above comments.       2.  Otherwise, no significant change. I personally reviewed all labs and images today   Assessment:     Left MCA stroke   Concerning for embolic etiology and vessel imaging as well as cardio-embolic sources need to be investigated.      His exam shows significant R hemiparesis/plegia, R facial droop, aphasia, dysarthria  Plan:     CTA head/neck     Echo with bubble    Continue DAPT and high intensity statin    PT OT speech    Risk factor control    Stroke education      Arthur Branch PA-C  9:48 AM  11/14/2020

## 2020-11-14 NOTE — PROGRESS NOTES
Pt daughter Kathe Contreras called about updates and also wanted Dr. Isra Novak to call her. Dr. Isra Novak notified and pt daughter  Kathe Contreras updated.

## 2020-11-14 NOTE — PROGRESS NOTES
Speech Language Pathology  SPEECH LANGUAGE PATHOLOGY  DAILY PROGRESS NOTE        PATIENT NAME:  Teresa Carranza      :  1945          TODAY'S DATE:  2020 ROOM:  4957/8845-B    Speech Pathologist (SLP) completed education with the patient/family regarding type of swallowing impairment. Reviewed current solid/liquid consistency diet recommendations and discussed compensatory strategies to ensure safe PO intake. Reviewed aspiration precautions. Encouraged patient and/or family to engage SLP in unstructured Q&A session relative to identified deficit areas; indicated understanding of all information provided via satisfactory verbal response. Per MBSS, a puree diet with moderately thick honey liquids is recommended at this time. Pt will require full assistance with feeding activities. SLP will cont to follow for dysphagia management to monitor tolerance for prescribed diet, implement safe swallowing compensatory strategies, and advance diet as clinically indicated. Efren Cho M.Ed. CCC-SLP  SP [id number]       CPT code(s) 43234  dysphagia tx   Total minutes :  10 minutes

## 2020-11-14 NOTE — PROGRESS NOTES
Speech Language Pathology  SPEECH/LANGUAGE PATHOLOGY  VIDEOFLUOROSCOPIC STUDY OF SWALLOWING (MBS)      PATIENT NAME:  Destiney Landers      :  1945      TODAY'S DATE:  2020   ROOM:  8509/8509-B    SUMMARY OF EVALUATION     DYSPHAGIA DIAGNOSIS: moderate oropharyngeal dysphagia       DIET RECOMMENDATIONS:  Dysphagia 1, Pureed solids with  honey consistency (moderately thick) liquids     FEEDING RECOMMENDATIONS:     Assistance level:  Full assistance is needed during all oral intake      Compensatory strategies recommended: Small bites/sips, Alternate solids and liquids and Check for oral pocketing    THERAPY RECOMMENDATIONS:      Dysphagia therapy is recommended 3-5 times per week for LOS or when goals are met. Pt will complete oral motor strength/ coordination exercises to improve bolus prep/ control and mastication with  moderate verbal prompts . Pt will complete BOTR strength/ ROM exercises to reduce pharyngeal residuals and improve epiglottic inversion with  moderate verbal prompts. Pt will complete laryngeal strength/ ROM therapeutic exercises to improve airway protection for the least restrictive PO diet with  moderate verbal prompts   Ongoing meal endurance analysis to provide diet modification and compensatory strategy implementation to minimize risk of aspiration associated with PO intake  Pt will tolerate the least restrictive PO diet to maintain adequate nutrition/ hydration via use of safe swallow/ compensatory strategies with  moderate verbal prompts and no more than 1 overt s/s of pen/asp. Pt will complete PO trials of upgraded diet textures with SLP only to determine the least restrictive PO diet to maintain adequate nutrition/hydration with no more than 1 overt s/s of pen/asp.   Instruction regarding appropriate implementation of compensatory strategies to improve integrity of swallow function during PO intake                   PROCEDURE     Consistencies Administered During the Evaluation   Liquids:  nectar thick liquid and honey thick liquid   Solids:  pureed foods      Method of Intake:   cup, spoon  Fed by clinician      Position:   Seated, upright, Lateral plane    Current Respiratory Status   room air                RESULTS     ORAL STAGE       Inadequate labial seal resulting anterior labial spillage from midline , Delayed A-P transit due to: decreased ability for initiation   , reduced lingual strength  and cognitive function , Oral residuals were noted :  throughout the oral cavity and Decreased bolus formation resulting in premature pharyngeal spillage        PHARYNGEAL STAGE           ONSET TIME     Delayed initiation of the pharyngeal swallow was noted with swallow reflex triggered at the level of the tongue base        PHARYNGEAL RESIDUALS          Vallecula/Pharyngeal Wall           Reduced tongue base retraction resulting in residuals in vallecula and/or posterior pharyngeal wall for nectar consistency liquid and pureed foods which mostly cleared with cued double swallow      Pyriform Sinuses      No significant residuals were noted in the pyriform sinuses    LARYNGEAL PENETRATION     Laryngeal penetration occurred in the absence of aspiration DURING the swallow for nectar consistency liquid due to  delayed laryngeal closure and inadequate laryngeal closure which cleared from the laryngeal vestibule with a cued, re-directive throat clear .  Laryngeal penetration was moderate and occurred consistently   an absent cough/throat clear was noted                 ASPIRATION    Aspiration was not present during this evaluation         COMPENSATORY STRATEGIES       Compensatory strategies that were beneficial included Double swallow, Small bites/sips and Throat clear      STRUCTURAL/FUNCTIONAL ANOMALIES       No structural/functional anomalies were noted      CERVICAL ESOPHAGEAL STAGE :        The cervical esophagus appeared adequate                            The Speech Language Pathologist (SLP) completed education with the patient regarding results of evaluation. Explained that Speech Pathology intervention is warranted  at this time   Prognosis for improvements is fair +     This plan will be re-evaluated and revised in 1 week  if warranted. Patient stated goals: Agreed with above,   Treatment goals discussed with Patient   The Patient has limited understanding the diagnosis, prognosis and plan of care and will require reinforcement/ re-education. CPT code:  63529  dysphagia study        [x]The admitting diagnosis and active problem list, as listed below have been reviewed prior to initiation of this evaluation. ADMITTING DIAGNOSIS: Acute CVA (cerebrovascular accident) ([address] [I63.9]  Acute CVA (cerebrovascular accident) ([address] [I63.9]  Acute CVA (cerebrovascular accident) ([address] [I63.9]     ACTIVE PROBLEM LIST:   Patient Active Problem List   Diagnosis    Perforated viscus    Diverticulitis of large intestine without bleeding    Acute ischemic cerebrovascular accident (CVA) involving left middle cerebral artery territory ([address]    HLD (hyperlipidemia)    Acute right-sided weakness    Rheumatoid arthritis ([address]    Fall    Altered mental status    Essential hypertension    CAD (coronary artery disease)    Tobacco dependence    Stage 3 chronic kidney disease    Dysarthria due to acute cerebellar stroke ([address]   Carey Schulte M.Ed. [address] [id number]

## 2020-11-14 NOTE — PROGRESS NOTES
Admit date: 11/12/2020  Primary [address] Mccormick DO   Patient Care Team:  Mirna Gomez DO as PCP - General (Family Medicine)  Tel: [phone]  IP CONSULT TO NEUROLOGY  IP CONSULT TO [address] [facility] [address]  Admitting Physician: Santana Gunn DO   Code Status:  Full Code  Diet NPO Effective Now    Viri Trejo is a 67 y.o.  male     Neurology note reviewed. Patient today has increased right-sided weakness and on exam hemiplegic on the right side. Repeat brain CT was obtained today and did not reveal any acute hemorrhage. Patient is very dysarthric and cannot comprehend his words    Positive aspiration to nectar consistencies of barium contrast on swallow study. Purée diet with moderately thick honey liquids recommended by speech therapy. Problem List: Active Problems:    Acute ischemic cerebrovascular accident (CVA) involving left middle cerebral artery territory ([address]    HLD (hyperlipidemia)    Acute right-sided weakness    Rheumatoid arthritis ([address]    Fall    Altered mental status    Essential hypertension    CAD (coronary artery disease)    Tobacco dependence    Stage 3 chronic kidney disease    Dysarthria due to acute cerebellar stroke [facility])  Resolved Problems:    * No resolved hospital problems. *      <principal problem not specified>    Assessment:    CVA with dysarthria and right-sided weakness   Altered mental status  Hypertension  Prediabetic with a hemoglobin A1c of 5.7  Hyperlipidemia   Coronary artery disease  Rheumatoid arthritis  Mild leukocytosis    Plan:    Continue aspirin and Plavix. PT and acute rehab. Presently subcu heparin for DVT prophylaxis is on hold and will leave at neurology's discretion. PAST MEDICAL HISTORY    has a past medical history of Arthritis and Hyperlipidemia. SURGICAL HISTORY      has a past surgical history that includes fracture surgery.     CURRENT MEDICATIONS        aspirin  300 mg Rectal Daily    clopidogrel  75 mg Oral Daily    sodium chloride flush  10 mL Intravenous 2 times per day    atorvastatin  80 mg Oral Nightly    aspirin  81 mg Oral Daily     PRN Meds perflutren lipid microspheres, sodium chloride flush, acetaminophen **OR** acetaminophen, polyethylene glycol, ipratropium-albuterol  Continuous Infusions:     aspirin suppository 300 mg, Daily  clopidogrel (PLAVIX) tablet 75 mg, Daily  perflutren lipid microspheres (DEFINITY) injection 1.65 mg, ONCE PRN  sodium chloride flush 0.9 % injection 10 mL, 2 times per day  sodium chloride flush 0.9 % injection 10 mL, PRN  acetaminophen (TYLENOL) tablet 650 mg, Q6H PRN    Or  acetaminophen (TYLENOL) suppository 650 mg, Q6H PRN  polyethylene glycol (GLYCOLAX) packet 17 g, Daily PRN  atorvastatin (LIPITOR) tablet 80 mg, Nightly  aspirin chewable tablet 81 mg, Daily  ipratropium-albuterol (DUONEB) nebulizer solution 1 ampule, Q4H PRN          HOME MEDICATIONS     Medications Prior to Admission: albuterol sulfate  (90 Base) MCG/ACT inhaler, Inhale 2 puffs into the lungs every 6 hours as needed for Wheezing  methotrexate (RHEUMATREX) 2.5 MG chemo tablet, Take 12.5 mg by mouth once a week   predniSONE (DELTASONE) 20 MG tablet, Take 20 mg by mouth daily as needed (Arthritis Pain)   simvastatin (ZOCOR) 10 MG tablet, Take 10 mg by mouth nightly      ALLERGIES     has No Known Allergies. No Known Allergies    SOCIAL HISTORY      reports that he quit smoking about 4 years ago. His smoking use included cigarettes. He has never used smokeless tobacco. He reports current alcohol use. He reports that he does not use drugs. On exam appears alert in no apparent distress. /69   Pulse 72   Temp 98.8 °F (37.1 °C) (Temporal)   Resp 20   Ht 5' 11\" (1.803 m)   Wt 192 lb 1.6 oz (87.1 kg)   SpO2 94%   BMI 26.79 kg/m²   Temp (24hrs), Av.5 °F (36.9 °C), Min:98.1 °F (36.7 °C), Max:98.8 °F (37.1 °C)    Pulse Ox:  SpO2  Av %  Min: 94 %  Max: 96 %  Supplemental O2:     Oxygen Therapy  SpO2: 94 %  Pulse Oximeter Device Mode: Intermittent  Pulse Oximeter Device Location: Finger  O2 Device: None (Room air)    Patient Vitals for the past 96 hrs (Last 3 readings):   Weight   11/14/20 0315 192 lb 1.6 oz (87.1 kg)   11/13/20 0417 183 lb 9.6 oz (83.3 kg)     Admission weight: 183 lb 9.6 oz (83.3 kg)  Wt Readings from Last 3 Encounters:   11/14/20 192 lb 1.6 oz (87.1 kg)   10/27/20 200 lb (90.7 kg)   07/08/19 184 lb (83.5 kg)    (encounters)    /O (24Hr): Intake/Output Summary (Last 24 hours) at 11/14/2020 0714  Last data filed at 11/13/2020 1755  Gross per 24 hour   Intake 1157 ml   Output --   Net 1157 ml       Alert NAD. Vitals reviewed. H&N: atraumatic, normocephalic, anicteric, no conjunctivitis, EOM normal, no lid lag or exophthalmos, nose and ears appear normal, trachea mid line, no stridor,  Chest: no respiratory distress, normal effort, normal breath sounds, no rales or wheezes,    Heart: normal heart sounds, regular rate and rhythm, no murmurs. gallops or rubs,  Abdomen: BS present, non-tender, no masses or organomegaly detected,   Extremities: no peripheral edema, no cyanosis. no oncholysis. Skin: no rash, no erythema, no jaundice,   CNS: Dysarthria and right-sided hemiplegia    LABS:    CBC:   Recent Labs     11/13/20  0236 11/14/20  0510   WBC 10.8 11.9*   RBC 4.25 4.32   HGB 13.8 13.6   HCT 40.2 41.4   MCV 94.6 95.8   MCH 32.5 31.5   MCHC 34.3 32.9   RDW 13.7 13.3    253   MPV 10.1 10.3       MAG:   Recent Labs     11/12/20  1859   MG 2.0       CMP:   Recent Labs     11/13/20  0235 11/14/20  0510    138   K 4.1 3.9   * 104   CO2 23 22   BUN 20 18   CREATININE 1.1 1.0   GLUCOSE 96 87   CALCIUM 9.1 8.8   PROT 6.0* 6.1*   LABALBU 3.6 3.3*   BILITOT 0.6 0.8   ALKPHOS 69 73   AST 17 16   ALT 12 11      No results for input(s): LIPASE, AMYLASE in the last 72 hours.     Phosphorus:    Recent Labs     11/12/20  1859   PHOS 2.9     Albumin:   Recent Labs 11/13/20  0235 11/14/20  0510   LABALBU 3.6 3.3*       U/A:No results found for: NITRITE, COLORU, PHUR, LABCAST, WBCUA, RBCUA, MUCUS, TRICHOMONAS, YEAST, BACTERIA, CLARITYU, SPECGRAV, LEUKOCYTESUR, UROBILINOGEN, BILIRUBINUR, GLUCOSEU, AMORPHOUS    Recent Labs     11/13/20  0444   INR 1.2   No results found for: DDIMERNo results found for: BNP  troponins  Lab Results   Component Value Date    TROPONINI <0.01 06/18/2019         D Dimer: No results for input(s): DDIMER in the last 72 hours. Troponin T No results for input(s): TROPONINT in the last 72 hours. ProBNP   No results found for requested labs within last 30 days. ProBNP Invalid input(s): PRO-BNP  Lactic acid:Invalid input(s): LACTIC ACID      PT/INR:   Recent Labs     11/13/20  0444   PROTIME 13.3*   INR 1.2     APTT: No results for input(s): APTT in the last 72 hours. Blood Culture: No results for input(s): BC, BLOODCULT2, ORG in the last 72 hours. GRAM STAIN  No results for input(s): LABGRAM, LABANAE, ORG, WNDABS in the last 72 hours. Resp Culture Brief : No results found for: CULTRESP    Body Fluid : No results found for: BFCX    MRSA : No results found for: [facility]    Urine Culture Brief : No results found for: LABURIN     Organism Brief : No results found for: ORG      Xr Wrist Left (min 3 Views)    Result Date: 10/27/2020  EXAMINATION: 4 XRAY VIEWS OF THE LEFT WRIST 10/27/2020 2:50 pm COMPARISON: Wrist radiograph October 27, 2020 HISTORY: ORDERING SYSTEM PROVIDED HISTORY: PORTABLE. In finger traps TECHNOLOGIST PROVIDED HISTORY: Reason for exam:->PORTABLE. In finger traps FINDINGS: Again demonstrated is the distal radial fracture and ulnar styloid fracture without interval change in appearance or alignment. Soft tissue edema seen in the left wrist.  There is a distal radioulnar articulation and radiocarpal articulations are intact. Intercarpal articulations are intact     No significant interval change compared to prior examination. No change in alignment of distal radial fracture or ulnar styloid fracture. Xr Wrist Left (min 3 Views)    Result Date: 10/27/2020  EXAMINATION: 4 XRAY VIEWS OF THE LEFT WRIST 10/27/2020 12:30 pm COMPARISON: None. HISTORY: ORDERING SYSTEM PROVIDED HISTORY: Fall/Pain TECHNOLOGIST PROVIDED HISTORY: Reason for exam:->Fall/Pain FINDINGS: There is fracture involving the distal radial metaphysis with dorsal angulation. The distal radioulnar and radiocarpal articulations are intact. Intercarpal articulations are intact. Soft tissue edema adjacent to the distal radius. Distal radial fracture with dorsal angulation. Xr Hand Left (min 3 Views)    Result Date: 10/27/2020  EXAMINATION: THREE XRAY VIEWS OF THE LEFT HAND 10/27/2020 12:35 pm COMPARISON: None. HISTORY: ORDERING SYSTEM PROVIDED HISTORY: pain TECHNOLOGIST PROVIDED HISTORY: Reason for exam:->pain FINDINGS: There is a fracture of the distal radius and distal ulna. These will be described on the dedicated wrist images. The 1st through 5th digits are intact and unremarkable. Fracture of the distal radius and ulna. Please see the dedicated wrist report. Mri Brain Wo Contrast    Result Date: 11/12/2020  EXAMINATION: MRI OF THE BRAIN WITHOUT CONTRAST  11/12/2020 8:30 pm TECHNIQUE: Multiplanar multisequence MRI of the brain was performed without the administration of intravenous contrast. COMPARISON: None. HISTORY: ORDERING SYSTEM PROVIDED HISTORY: TIA vs CVA TECHNOLOGIST PROVIDED HISTORY: Reason for exam:->TIA vs CVA What reading provider will be dictating this exam?->CRC FINDINGS: INTRACRANIAL STRUCTURES/VENTRICLES: There is abnormal restricted diffusion involving the left MCA distribution involving left basal ganglia and left temporal lobe. Corresponding T2 FLAIR hyperintensity identified. No hemorrhagic transformation. Otherwise mild generalized involutional change with prominence ventricles sulci.   Mild sulcal effacement temporal lobe anteriorly related to the evolving acute to subacute infarct. No mass effect or midline shift. No evidence of an acute intracranial hemorrhage. The ventricles and sulci are normal in size and configuration. The sellar/suprasellar regions appear unremarkable. The normal signal voids within the major intracranial vessels appear maintained. ORBITS: The visualized portion of the orbits demonstrate no acute abnormality. SINUSES: The visualized paranasal sinuses and mastoid air cells are well aerated. BONES/SOFT TISSUES: The bone marrow signal intensity appears normal. The soft tissues demonstrate no acute abnormality. Acute subacute left MCA distribution infarct. Negative for hemorrhagic transformation. This note was dictated using M*Modal Fluency Direct so please excuse any grammatical or syntax errors as no guarantees can be provided that every mistake has been identified and corrected by editing.       Electronically signed by Reny Singh MD  on 11/14/2020 at 7:14 AM

## 2020-11-14 NOTE — PLAN OF CARE
Problem: Falls - Risk of:  Goal: Will remain free from falls  Description: Will remain free from falls  11/14/2020 1556 by Dee Dee Milton RN  Outcome: Met This Shift     Problem: Falls - Risk of:  Goal: Absence of physical injury  Description: Absence of physical injury  11/14/2020 1556 by Dee Dee Milton RN  Outcome: Met This Shift     Problem: HEMODYNAMIC STATUS  Goal: Patient has stable vital signs and fluid balance  11/14/2020 1556 by Dee Dee Milton RN  Outcome: Met This Shift     Problem: Skin Integrity:  Goal: Will show no infection signs and symptoms  Description: Will show no infection signs and symptoms  Outcome: Met This Shift

## 2020-11-15 ENCOUNTER — APPOINTMENT (OUTPATIENT)
Dept: GENERAL RADIOLOGY | Age: 75
DRG: 065 | End: 2020-11-15
Payer: MEDICARE

## 2020-11-15 LAB
ALBUMIN SERPL-MCNC: 3.4 G/DL (ref 3.5–5.2)
ALP BLD-CCNC: 70 U/L (ref 40–129)
ALT SERPL-CCNC: 12 U/L (ref 0–40)
ANION GAP SERPL CALCULATED.3IONS-SCNC: 14 MMOL/L (ref 7–16)
AST SERPL-CCNC: 19 U/L (ref 0–39)
BILIRUB SERPL-MCNC: 0.9 MG/DL (ref 0–1.2)
BUN BLDV-MCNC: 13 MG/DL (ref 8–23)
CALCIUM SERPL-MCNC: 8.9 MG/DL (ref 8.6–10.2)
CHLORIDE BLD-SCNC: 110 MMOL/L (ref 98–107)
CO2: 20 MMOL/L (ref 22–29)
CREAT SERPL-MCNC: 0.9 MG/DL (ref 0.7–1.2)
GFR AFRICAN AMERICAN: >60
GFR NON-AFRICAN AMERICAN: >60 ML/MIN/1.73
GLUCOSE BLD-MCNC: 112 MG/DL (ref 74–99)
HCT VFR BLD CALC: 41 % (ref 37–54)
HEMOGLOBIN: 13.7 G/DL (ref 12.5–16.5)
LV EF: 55 %
LVEF MODALITY: NORMAL
MCH RBC QN AUTO: 31.4 PG (ref 26–35)
MCHC RBC AUTO-ENTMCNC: 33.4 % (ref 32–34.5)
MCV RBC AUTO: 93.8 FL (ref 80–99.9)
METER GLUCOSE: 112 MG/DL (ref 74–99)
PDW BLD-RTO: 13.1 FL (ref 11.5–15)
PLATELET # BLD: 256 E9/L (ref 130–450)
PMV BLD AUTO: 10.3 FL (ref 7–12)
POTASSIUM SERPL-SCNC: 3.7 MMOL/L (ref 3.5–5)
RBC # BLD: 4.37 E12/L (ref 3.8–5.8)
SODIUM BLD-SCNC: 144 MMOL/L (ref 132–146)
TOTAL PROTEIN: 5.8 G/DL (ref 6.4–8.3)
WBC # BLD: 10.2 E9/L (ref 4.5–11.5)

## 2020-11-15 PROCEDURE — 97530 THERAPEUTIC ACTIVITIES: CPT

## 2020-11-15 PROCEDURE — 80053 COMPREHEN METABOLIC PANEL: CPT

## 2020-11-15 PROCEDURE — 6360000002 HC RX W HCPCS: Performed by: INTERNAL MEDICINE

## 2020-11-15 PROCEDURE — 82962 GLUCOSE BLOOD TEST: CPT

## 2020-11-15 PROCEDURE — 93306 TTE W/DOPPLER COMPLETE: CPT

## 2020-11-15 PROCEDURE — 36415 COLL VENOUS BLD VENIPUNCTURE: CPT

## 2020-11-15 PROCEDURE — 6370000000 HC RX 637 (ALT 250 FOR IP): Performed by: INTERNAL MEDICINE

## 2020-11-15 PROCEDURE — 99232 SBSQ HOSP IP/OBS MODERATE 35: CPT | Performed by: PHYSICIAN ASSISTANT

## 2020-11-15 PROCEDURE — 2580000003 HC RX 258: Performed by: INTERNAL MEDICINE

## 2020-11-15 PROCEDURE — 2700000000 HC OXYGEN THERAPY PER DAY

## 2020-11-15 PROCEDURE — 71045 X-RAY EXAM CHEST 1 VIEW: CPT

## 2020-11-15 PROCEDURE — 85027 COMPLETE CBC AUTOMATED: CPT

## 2020-11-15 PROCEDURE — 2060000000 HC ICU INTERMEDIATE R&B

## 2020-11-15 RX ORDER — HEPARIN SODIUM 10000 [USP'U]/ML
5000 INJECTION, SOLUTION INTRAVENOUS; SUBCUTANEOUS EVERY 8 HOURS
Status: DISCONTINUED | OUTPATIENT
Start: 2020-11-15 | End: 2020-11-20 | Stop reason: HOSPADM

## 2020-11-15 RX ADMIN — DEXTROSE AND SODIUM CHLORIDE: 5; 900 INJECTION, SOLUTION INTRAVENOUS at 17:54

## 2020-11-15 RX ADMIN — DEXTROSE AND SODIUM CHLORIDE: 5; 900 INJECTION, SOLUTION INTRAVENOUS at 05:13

## 2020-11-15 RX ADMIN — ASPIRIN 81 MG CHEWABLE TABLET 81 MG: 81 TABLET CHEWABLE at 08:33

## 2020-11-15 RX ADMIN — HEPARIN SODIUM 5000 UNITS: 10000 INJECTION INTRAVENOUS; SUBCUTANEOUS at 17:55

## 2020-11-15 RX ADMIN — CLOPIDOGREL 75 MG: 75 TABLET, FILM COATED ORAL at 08:33

## 2020-11-15 RX ADMIN — SODIUM CHLORIDE, PRESERVATIVE FREE 10 ML: 5 INJECTION INTRAVENOUS at 08:33

## 2020-11-15 ASSESSMENT — PAIN SCALES - GENERAL
PAINLEVEL_OUTOF10: 0

## 2020-11-15 NOTE — PROGRESS NOTES
Pt was being fed by nurse when pt started to cough repeatedly and. Dr. Dell Blanco was notified. Pt is NPO now.

## 2020-11-15 NOTE — PROGRESS NOTES
Physical Therapy  Treatment Note    Name: Joann Eason  : 1945  MRN: [id number]    Referring Provider:  Kena Pineda DO    Date of Service: 11/15/2020    Evaluating PT:  Priyanka Olmos PT, DPT PT [id number]    Room #:  8894/2284-W  Diagnosis:  L MCA CVA  PMHx/PSHx:  Arthritis, HLD  Procedure/Surgery:    Precautions:  R Hemiparesis, Falls, Aphasia, L Wrist Fx 11/3/2020   Equipment Needs:  TBD    SUBJECTIVE:    Pt lives alone in a 1 story home with 3 stairs to enter and single rail. Bed is on first floor and bath is on first floor. Pt ambulated with no device independently PTA. Pt independent and very active PTA. Equipment Owned:  None    OBJECTIVE:   Initial Evaluation  Date: 2020 Treatment  Date: 11/15/2020 Short Term/ Long Term   Goals   AM-PAC 6 Clicks 62/70 50/25    Was pt agreeable to Eval/treatment? Yes yes    Does pt have pain? No c/o pain (no facial grimace noted) No signs of pain    Bed Mobility  Rolling: Brian  Supine to sit: ModA  Sit to supine: Nt  Scooting: ModA maxA all aspects. Max VC and TC required Rolling: Independent  Supine to sit: Independent  Sit to supine: Independent  Scooting: Independent     Transfers Sit to stand: ModA x 2  Stand to sit: ModA x 2  Stand pivot: MaxA x 2 no AD  Sit<>stand: maxA with R knee block  Stand pivot: maxA with R knee block Sit to stand: Brian    Stand to sit: Brian    Stand pivot: Brian AAD   Ambulation    3 feet with MaxA x 2 HHA NT, pt unable >25 feet with Brian  AAD   Stair negotiation: ascended and descended  NT** NT    ROM BUE:  See OT Note  BLE:  WFL     Strength BUE:  See OT Note  RLE: 2/5 hip and knee  No ankle AROM observed  LLE: 3/5 RLE: no active muscle movement noted in RLE Improve Strength 1/3 MMT Grade   Balance Sitting EOB:  Brian  Dynamic Standing:  MaxA x 2 HHA Sitting EOB: Brian  Dynamic standing: maxA Sitting EOB:  Independent    Dynamic Standing:  Brian AAD     Pt is A & O x unable to assess d/t aphasia.   Pt following approximately 50% of commands  Sensation:  Pt denies numbness and tingling to extremities  Edema:  unremarkable      Patient education  Pt educated on role of PT intervention. Pt educated on safety in room with utilization of call light for assistance with mobility. Patient response to education:   Pt verbalized understanding Pt demonstrated skill Pt requires further education in this area   yes yes yes     ASSESSMENT:    Comments:  RN cleared pt for activity prior to session. Pt received supine in bed and agreeable to PT intervention at this time. Pt performed all functional mobility as noted above. Pt continues to have severe aphasia and worsening weakness of R hemibody. Pt also seems to be experiencing R hemineglect as his gaze does not cross midline without physical assistance via head turn. At end of session, pt transferred to bedside chair and left with all needs met and call light in reach. Pt requires continued skilled PT intervention to address deficits described above to maximize functional mobility and independence. Treatment:  Patient practiced and was instructed in the following treatment:     Therapeutic Activities Completed:  o Functional mobility as noted above:   - Bed mobility: maxA all aspects. Max VC and TC required. Pt attempting to roll towards L side when cued to roll towards R. Max redirection required. At EOB pt sat at [facility]. - Transfer training: sit<>stand maxA x 2 reps with R knee block. Attempted side steps, but pt was unable to advance RLE and LLE buckled. Stand pivot performed with maxA and R knee block.   o Skilled repositioning in seated position for comfort. RUE propped on pillow to promote some weight bearing through RUE. RLE positioned on leg of tray to apply pressure through the foot. o Pt education as noted above. PLAN:    Patient is making fair progress towards established goals. Will continue with current POC.       Time in  0806  Time out  0824    Total Treatment Time

## 2020-11-15 NOTE — PROGRESS NOTES
Admit date: 11/12/2020  Primary [address] Lynda Mccormick DO   Patient Care Team:  Vannesa Jackson DO as PCP - General (Family Medicine)  Tel: [phone]  IP CONSULT  Fernando Gaines  Admitting Physician: Joseph Hicks DO   Code Status:  Full Code  Diet NPO Effective Now    Dalila Rodriguez is a 67 y.o.  male     When asked if he could move his right side of the body he says he can but ends up moving the left side. Unable to verbalize is very dysarthric. Patient apparently has started coughing while eating and has now been placed n.p.o. however requiring oxygen will obtain chest x-ray to rule out aspiration. Patient will need to be reassessed for aspiration and may require PEG tube placement. Nursing contacted neurology with respect to DVT prophylaxis and Dr. Isra Novak was amenable to starting subcu heparin. Problem List: Active Problems:    Acute ischemic cerebrovascular accident (CVA) involving left middle cerebral artery territory ([address]    HLD (hyperlipidemia)    Acute right-sided weakness    Rheumatoid arthritis ([address]    Fall    Altered mental status    Essential hypertension    CAD (coronary artery disease)    Tobacco dependence    Stage 3 chronic kidney disease    Dysarthria due to acute cerebellar stroke [facility])  Resolved Problems:    * No resolved hospital problems. *      <principal problem not specified>    Assessment:     CVA with dysarthria and right-sided weakness   Altered mental status  Hypertension  Prediabetic with a hemoglobin A1c of 5.7  Hyperlipidemia   Coronary artery disease  Rheumatoid arthritis  Mild leukocytosis     Plan:     Order chest x-ray  Continue aspirin and Plavix. PT and acute rehab.  subcu heparin for DVT prophylaxis        PAST MEDICAL HISTORY    has a past medical history of Arthritis and Hyperlipidemia. SURGICAL HISTORY      has a past surgical history that includes fracture surgery.     CURRENT MEDICATIONS        heparin (porcine) 5,000 Units Subcutaneous Q8H    aspirin  300 mg Rectal Daily    clopidogrel  75 mg Oral Daily    sodium chloride flush  10 mL Intravenous 2 times per day    atorvastatin  80 mg Oral Nightly    aspirin  81 mg Oral Daily     PRN Meds barium sulfate, sodium chloride flush, perflutren lipid microspheres, sodium chloride flush, acetaminophen **OR** acetaminophen, polyethylene glycol, ipratropium-albuterol  Continuous Infusions:    dextrose 5 % and 0.9 % NaCl 70 mL/hr at 11/15/20 0513       heparin (porcine) injection 5,000 Units, Q8H  barium sulfate 40 % reconsitutable suspension, ONCE PRN  sodium chloride flush 0.9 % injection 10 mL, PRN  dextrose 5 % and 0.9 % sodium chloride infusion, Continuous  aspirin suppository 300 mg, Daily  clopidogrel (PLAVIX) tablet 75 mg, Daily  perflutren lipid microspheres (DEFINITY) injection 1.65 mg, ONCE PRN  sodium chloride flush 0.9 % injection 10 mL, 2 times per day  sodium chloride flush 0.9 % injection 10 mL, PRN  acetaminophen (TYLENOL) tablet 650 mg, Q6H PRN    Or  acetaminophen (TYLENOL) suppository 650 mg, Q6H PRN  polyethylene glycol (GLYCOLAX) packet 17 g, Daily PRN  atorvastatin (LIPITOR) tablet 80 mg, Nightly  aspirin chewable tablet 81 mg, Daily  ipratropium-albuterol (DUONEB) nebulizer solution 1 ampule, Q4H PRN          HOME MEDICATIONS     Medications Prior to Admission: albuterol sulfate  (90 Base) MCG/ACT inhaler, Inhale 2 puffs into the lungs every 6 hours as needed for Wheezing  methotrexate (RHEUMATREX) 2.5 MG chemo tablet, Take 12.5 mg by mouth once a week Sunday  predniSONE (DELTASONE) 20 MG tablet, Take 20 mg by mouth daily as needed (Arthritis Pain)   simvastatin (ZOCOR) 10 MG tablet, Take 10 mg by mouth nightly      ALLERGIES     has No Known Allergies. No Known Allergies    SOCIAL HISTORY      reports that he quit smoking about 4 years ago. His smoking use included cigarettes. He has never used smokeless tobacco. He reports current alcohol use. He reports that he does not use drugs. On exam appears alert in no apparent distress. /72   Pulse 80   Temp 98.2 °F (36.8 °C) (Temporal)   Resp 20   Ht 5' 11\" (1.803 m)   Wt 189 lb 1.6 oz (85.8 kg)   SpO2 93%   BMI 26.37 kg/m²   Temp (24hrs), Av °F (36.7 °C), Min:97.4 °F (36.3 °C), Max:98.4 °F (36.9 °C)    Pulse Ox: SpO2  Av %  Min: 88 %  Max: 95 %  Supplemental O2: O2 Flow Rate (L/min): 2 L/min   Oxygen Therapy  SpO2: 93 %  Pulse Oximeter Device Mode: Intermittent  Pulse Oximeter Device Location: Finger  O2 Device: Nasal cannula  O2 Flow Rate (L/min): 2 L/min    Patient Vitals for the past 96 hrs (Last 3 readings):   Weight   11/15/20 0516 189 lb 1.6 oz (85.8 kg)   20 0315 192 lb 1.6 oz (87.1 kg)   20 0417 183 lb 9.6 oz (83.3 kg)     Admission weight: 183 lb 9.6 oz (83.3 kg)  Wt Readings from Last 3 Encounters:   11/15/20 189 lb 1.6 oz (85.8 kg)   10/27/20 200 lb (90.7 kg)   19 184 lb (83.5 kg)    (encounters)    /O (24Hr): Intake/Output Summary (Last 24 hours) at 11/15/2020 1646  Last data filed at 11/15/2020 1615  Gross per 24 hour   Intake 1943.01 ml   Output 350 ml   Net 1593.01 ml       Alert NAD. Vitals reviewed. H&N: atraumatic, normocephalic, anicteric, no conjunctivitis, EOM normal, no lid lag or exophthalmos, nose and ears appear normal, trachea mid line, no stridor,  Chest: no respiratory distress, normal effort, normal breath sounds, no rales or wheezes,    Heart: normal heart sounds, regular rate and rhythm, no murmurs. gallops or rubs,  Abdomen: BS present, non-tender, no masses or organomegaly detected,   Extremities: no peripheral edema, no cyanosis. no oncholysis.  Skin: no rash, no erythema, no jaundice,   CNS: Dysarthria and right-sided hemiplegia    LABS:    CBC:   Recent Labs     20  0236 20  0510 11/15/20  0503   WBC 10.8 11.9* 10.2   RBC 4.25 4.32 4.37   HGB 13.8 13.6 13.7   HCT 40.2 41.4 41.0   MCV 94.6 95.8 93.8   MCH 32.5 31.5 31.4   MCHC 34.3 32.9 33.4   RDW 13.7 13.3 13.1    253 256   MPV 10.1 10.3 10.3       MAG:   Recent Labs     11/12/20  1859   MG 2.0       CMP:   Recent Labs     11/13/20  0235 11/14/20  0510 11/15/20  0503    138 144   K 4.1 3.9 3.7   * 104 110*   CO2 23 22 20*   BUN 20 18 13   CREATININE 1.1 1.0 0.9   GLUCOSE 96 87 112*   CALCIUM 9.1 8.8 8.9   PROT 6.0* 6.1* 5.8*   LABALBU 3.6 3.3* 3.4*   BILITOT 0.6 0.8 0.9   ALKPHOS 69 73 70   AST 17 16 19   ALT 12 11 12      No results for input(s): LIPASE, AMYLASE in the last 72 hours. Phosphorus:    Recent Labs     11/12/20  1859   PHOS 2.9     Albumin:   Recent Labs     11/13/20 0235 11/14/20  0510 11/15/20  0503   LABALBU 3.6 3.3* 3.4*       U/A:No results found for: NITRITE, COLORU, PHUR, LABCAST, WBCUA, RBCUA, MUCUS, TRICHOMONAS, YEAST, BACTERIA, CLARITYU, SPECGRAV, LEUKOCYTESUR, UROBILINOGEN, BILIRUBINUR, GLUCOSEU, AMORPHOUS    Recent Labs     11/13/20  0444   INR 1.2   No results found for: DDIMERNo results found for: BNP  troponins  Lab Results   Component Value Date    TROPONINI <0.01 06/18/2019         D Dimer: No results for input(s): DDIMER in the last 72 hours. Troponin T No results for input(s): TROPONINT in the last 72 hours. ProBNP   No results found for requested labs within last 30 days. ProBNP Invalid input(s): PRO-BNP  Lactic acid:Invalid input(s): LACTIC ACID      PT/INR:   Recent Labs     11/13/20  0444   PROTIME 13.3*   INR 1.2     APTT: No results for input(s): APTT in the last 72 hours. Blood Culture: No results for input(s): BC, BLOODCULT2, ORG in the last 72 hours. GRAM STAIN  No results for input(s): LABGRAM, LABANAE, ORG, WNDABS in the last 72 hours.     Resp Culture Brief : No results found for: CULTRESP    Body Fluid : No results found for: BFCX    MRSA : No results found for: [facility]    Urine Culture Brief : No results found for: LABURIN     Organism Brief : No results found for: ORG      Xr Wrist Left (min 3 performed without the administration of intravenous contrast. Dose modulation, iterative reconstruction, and/or weight based adjustment of the mA/kV was utilized to reduce the radiation dose to as low as reasonably achievable. COMPARISON: Noncontrast MRI of the brain 11/12/2020 HISTORY: ORDERING SYSTEM PROVIDED HISTORY: worsening stroke symptoms TECHNOLOGIST PROVIDED HISTORY: Reason for exam:->worsening stroke symptoms Has a \"code stroke\" or \"stroke alert\" been called? ->No What reading provider will be dictating this exam?->CRC FINDINGS: BRAIN/VENTRICLES: Acute/subacute infarction scattered about the mid/posterior aspect of the left MCA territory is unchanged in distribution, but has slightly increased in overall scope, now incompletely occupying a volume measuring approximately 8.5 cm oblique AP x  4.5 cm oblique transverse x  7.5 cm oblique craniocaudal, (previously by my assessment incompletely occupying a volume measuring approximately 7.5 cm oblique AP x  3.5 cm oblique transverse x 5.0 cm oblique craniocaudal). There is equivalent slight increase in effacement of the subjacent left lateral ventricle and overlying cortical sulci, without midline shift. Negative for hemorrhagic transformation. No other clinically-significant changes are noted. .     1. Slightly increased volume of, and mass effect related to, acute/subacute, non-hemorrhagic infarction scattered about the mid/posterior aspect of the left MCA territory, as described. Please see above comments. 2.  Otherwise, no significant change. Holt Hilt RECOMMENDATIONS: 1. Recommend follow-up CT versus MR imaging of the brain, as directed clinically. Hanny Maribel Neck W Contrast    Result Date: 11/14/2020  EXAMINATION: CTA OF THE HEAD WITH CONTRAST 11/14/2020 2:49 pm: TECHNIQUE: CTA of the head/brain was performed with the administration of intravenous contrast. Multiplanar reformatted images are provided for review. MIP images are provided for review.  Dose modulation, iterative reconstruction, and/or weight based adjustment of the mA/kV was utilized to reduce the radiation dose to as low as reasonably achievable.; CTA of the neck was performed with the administration of intravenous contrast. Multiplanar reformatted images are provided for review. MIP images are provided for review. Stenosis of the internal carotid arteries measured using NASCET criteria. Dose modulation, iterative reconstruction, and/or weight based adjustment of the mA/kV was utilized to reduce the radiation dose to as low as reasonably achievable. COMPARISON: Noncontrast head CT 11/14/2020; noncontrast time of the brain 11/12/2020 HISTORY: ORDERING SYSTEM PROVIDED HISTORY: stroke TECHNOLOGIST PROVIDED HISTORY: Reason for exam:->stroke Has a \"code stroke\" or \"stroke alert\" been called? ->No What reading provider will be dictating this exam?->CRC FINDINGS: This exam is slightly limited by patient motion near the level of the thoracic inlet, as well as beam-hardening artifacts related to the left-upper-extremity-approach contrast bolus. Given these factors: ANTERIOR CIRCULATION: There is severe attenuation of numerous M3/M4 branches scattered about the mid/posterior left MCA territory, extending proximally through the left M1 segment to the level of the origin of the left anterior cerebral artery, accompanied by moderate collateralization at the level of the left lateral lenticulostriate vessels. Moderate, predominantly hard atherosclerotic plaque is scattered throughout the cavernous/supraclinoid segments of the left internal carotid artery, producing scattered variable, short-segment, mild to moderate stenoses. Slightly lesser similar findings involve these segments on the right side, extending through the petrous right ICA segment.  Moderate, predominantly hard atherosclerotic plaque is present at the right common carotid arterial bifurcation, producing mild endoluminal surface irregularity, with hard atherosclerotic plaque is scattered throughout the cavernous/supraclinoid segments of the left internal carotid artery, producing scattered variable, short-segment, mild to moderate stenoses. Slightly lesser similar findings involve these segments on the right side, extending through the petrous right ICA segment. Lesser atherosclerotic arterial disease, is otherwise most notable at both common carotid arterial bifurcations, right worse than left, where there are less than 50% stenoses. Please see above comments. 3.  Previously-reported acute/subacute, non-hemorrhagic infarction scattered about the mid/posterior aspect of the left MCA territory is again noted, not significantly changed since noncontrast head CT obtained 11/14/2020 09:25 hours. Hanny López Head W Contrast    Result Date: 11/14/2020  EXAMINATION: CTA OF THE HEAD WITH CONTRAST 11/14/2020 2:49 pm: TECHNIQUE: CTA of the head/brain was performed with the administration of intravenous contrast. Multiplanar reformatted images are provided for review. MIP images are provided for review. Dose modulation, iterative reconstruction, and/or weight based adjustment of the mA/kV was utilized to reduce the radiation dose to as low as reasonably achievable.; CTA of the neck was performed with the administration of intravenous contrast. Multiplanar reformatted images are provided for review. MIP images are provided for review. Stenosis of the internal carotid arteries measured using NASCET criteria. Dose modulation, iterative reconstruction, and/or weight based adjustment of the mA/kV was utilized to reduce the radiation dose to as low as reasonably achievable. COMPARISON: Noncontrast head CT 11/14/2020; noncontrast time of the brain 11/12/2020 HISTORY: ORDERING SYSTEM PROVIDED HISTORY: stroke TECHNOLOGIST PROVIDED HISTORY: Reason for exam:->stroke Has a \"code stroke\" or \"stroke alert\" been called? ->No What reading provider will be dictating this exam?->CRC 8:30 pm TECHNIQUE: Multiplanar multisequence MRI of the brain was performed without the administration of intravenous contrast. COMPARISON: None. HISTORY: ORDERING SYSTEM PROVIDED HISTORY: TIA vs CVA TECHNOLOGIST PROVIDED HISTORY: Reason for exam:->TIA vs CVA What reading provider will be dictating this exam?->CRC FINDINGS: INTRACRANIAL STRUCTURES/VENTRICLES: There is abnormal restricted diffusion involving the left MCA distribution involving left basal ganglia and left temporal lobe. Corresponding T2 FLAIR hyperintensity identified. No hemorrhagic transformation. Otherwise mild generalized involutional change with prominence ventricles sulci. Mild sulcal effacement temporal lobe anteriorly related to the evolving acute to subacute infarct. No mass effect or midline shift. No evidence of an acute intracranial hemorrhage. The ventricles and sulci are normal in size and configuration. The sellar/suprasellar regions appear unremarkable. The normal signal voids within the major intracranial vessels appear maintained. ORBITS: The visualized portion of the orbits demonstrate no acute abnormality. SINUSES: The visualized paranasal sinuses and mastoid air cells are well aerated. BONES/SOFT TISSUES: The bone marrow signal intensity appears normal. The soft tissues demonstrate no acute abnormality. Acute subacute left MCA distribution infarct. Negative for hemorrhagic transformation. Fl Modified Barium Swallow W Video    Result Date: 11/14/2020  EXAMINATION: MODIFIED BARIUM SWALLOW WAS PERFORMED IN CONJUNCTION WITH SPEECH PATHOLOGY SERVICES TECHNIQUE: Fluoroscopic evaluation of the swallowing mechanism was performed with multiple consistency of barium product.  FLUOROSCOPY DOSE AND TYPE OR TIME AND EXPOSURES: Images 2 Fluoro time 0.8 minutes Does 8 mGy DAP 78.2 COMPARISON: None HISTORY: ORDERING SYSTEM PROVIDED HISTORY: aspiration TECHNOLOGIST PROVIDED HISTORY: Reason for exam:->aspiration Reason for exam:->Per speech What reading provider will be dictating this exam?->CRC FINDINGS: Positive aspiration to nectar consistencies of barium contrast.  No cough present. The retention in the vallecula of barium contrast.  Pharyngeal delayed was seen. The reduced laryngeal elevation. Positive aspiration to nectar consistencies of barium contrast Please see separate speech pathology report for full discussion of findings and recommendations. This note was dictated using M*Modal Fluency Direct so please excuse any grammatical or syntax errors as no guarantees can be provided that every mistake has been identified and corrected by editing.       Electronically signed by Quentin Rowland MD  on 11/15/2020 at 4:46 PM

## 2020-11-15 NOTE — PLAN OF CARE
Problem: Falls - Risk of:  Goal: Will remain free from falls  Description: Will remain free from falls  11/15/2020 0114 by Claudene Larry, RN  Outcome: Met This Shift  11/14/2020 1556 by Dalia St RN  Outcome: Met This Shift  Goal: Absence of physical injury  Description: Absence of physical injury  11/15/2020 0114 by Claudene Larry, RN  Outcome: Met This Shift  11/14/2020 1556 by Dalia St RN  Outcome: Met This Shift     Problem: HEMODYNAMIC STATUS  Goal: Patient has stable vital signs and fluid balance  11/15/2020 0114 by Claudene Larry, RN  Outcome: Met This Shift  11/14/2020 1556 by Dalia St RN  Outcome: Met This Shift     Problem: ACTIVITY INTOLERANCE/IMPAIRED MOBILITY  Goal: Mobility/activity is maintained at optimum level for patient  Outcome: Met This Shift     Problem: Skin Integrity:  Goal: Will show no infection signs and symptoms  Description: Will show no infection signs and symptoms  11/15/2020 0114 by Claudene Larry, RN  Outcome: Met This Shift  11/14/2020 1556 by Dalia St RN  Outcome: Met This Shift  Goal: Absence of new skin breakdown  Description: Absence of new skin breakdown  11/15/2020 0114 by Claudene Larry, RN  Outcome: Met This Shift  11/14/2020 1556 by Dalia St RN  Outcome: Met This Shift

## 2020-11-15 NOTE — PROGRESS NOTES
Dalila Rodriguez is a 67 y.o.  male     Neurology is following for stroke. Past medical history is significant for hyperlipidemia, hypertension, CKD and rheumatoid arthritis (on methotrexate), COPD    Patient presented to outside hospital with dysarthria and right-sided weakness. He was found to have a left MCA stroke and transferred here for further evaluation. He was outside the window for TPA. MRI of the brain revealed a left MCA stroke. Yesterday patient had worsening of right arm weakness and he was sent for stat CT head. Repeat CTH shows evolving LMCA infarct. No new areas identified. No hemorrhage. Vessel imaging was obtained and suggestive of moderate diffuse atherosclerotic disease -- no significant stenosis identified    He just had his echo completed. He is lethargic today. He remains aphasic and R hemiplegic/paretic.      ROS unable due to aphasia     Current Facility-Administered Medications   Medication Dose Route Frequency Provider Last Rate Last Dose    barium sulfate 40 % reconsitutable suspension  15 mL Oral ONCE PRN Zacarias Perez MD        sodium chloride flush 0.9 % injection 10 mL  10 mL Intravenous PRN Saran Hong II, MD   10 mL at 11/14/20 1453    dextrose 5 % and 0.9 % sodium chloride infusion   Intravenous Continuous Quentin Rowland MD 70 mL/hr at 11/15/20 0513      aspirin suppository 300 mg  300 mg Rectal Daily Evita Falk MD   300 mg at 11/14/20 1011    clopidogrel (PLAVIX) tablet 75 mg  75 mg Oral Daily Kaden Rodriguez MD   75 mg at 11/15/20 6641    perflutren lipid microspheres (DEFINITY) injection 1.65 mg  1.5 mL Intravenous ONCE PRN Joseph Hicks DO        sodium chloride flush 0.9 % injection 10 mL  10 mL Intravenous 2 times per day Joseph Hicks DO   10 mL at 11/15/20 9788    sodium chloride flush 0.9 % injection 10 mL  10 mL Intravenous PRN Joseph Hicks DO        acetaminophen (TYLENOL) tablet 650 mg  650 mg Oral Q6H PRN Ana Steff, DO   650 mg at 11/14/20 0135    Or    acetaminophen (TYLENOL) suppository 650 mg  650 mg Rectal Q6H PRN Ana Steff, DO   650 mg at 11/14/20 8954    polyethylene glycol (GLYCOLAX) packet 17 g  17 g Oral Daily PRN Ana Steff, DO        atorvastatin (LIPITOR) tablet 80 mg  80 mg Oral Nightly Veverly Mike Sims, DO   80 mg at 11/14/20 2150    aspirin chewable tablet 81 mg  81 mg Oral Daily [facility] MILLI Sims, DO   81 mg at 11/15/20 5857    ipratropium-albuterol (DUONEB) nebulizer solution 1 ampule  1 ampule Inhalation Q4H PRN Jess Biswas MD             Objective:       /62   Pulse 67   Temp 98.4 °F (36.9 °C) (Temporal)   Resp 20   Ht 5' 11\" (1.803 m)   Wt 189 lb 1.6 oz (85.8 kg)   SpO2 95%   BMI 26.37 kg/m²        General appearance: alert, appears stated age and cooperative  Head: Normocephalic, without obvious abnormality, atraumatic  Eyes: conjunctivae/corneas clear. Neck: no adenopathy, no carotid bruit, no JVD, supple  Lungs: clear to auscultation bilaterally  Heart: regular rate and rhythm, S1, S2 normal, no murmur, click, rub or gallop  Extremities: extremities normal, atraumatic, no cyanosis or edema  Pulses: 2+ and symmetric  Skin: Skin color, texture, turgor normal. No rashes or lesions     Mental Status: Lethargic. Follows very few commands for me today-- opens eyes, squeezes with L hand.      Speech: Severe dysarthria   Language: Globally aphasic     Cranial Nerves:  I: smell    II: visual acuity     II: visual fields Bilateral threat    II: pupils REX   III,VII: ptosis None   III,IV,VI: extraocular muscles  Full ROM   V: mastication Normal   V: facial light touch sensation     V,VII: corneal reflex     VII: facial muscle function - upper  Normal   VII: facial muscle function - lower R 7th UMN paresis    VIII: hearing Normal   IX: soft palate elevation  Normal   IX,X: gag reflex    XI: trapezius strength  5/5   XI: sternocleidomastoid strength 5/5 XI: neck extension strength  5/5   XII: tongue strength  Normal     Motor:  RUE 0/5   RLE 2/5  Moves L side spontaneously-- not to command today   Normal bulk   Decreased tone on R   No abnormal movements     Sensory:  ? Extensor posturing of RUE with deep pain; minimally withdraws to RLE     DTR:     R Babinskis    No pathological reflexes    Laboratory/Radiology:     CBC with Differential:    Lab Results   Component Value Date    WBC 10.2 11/15/2020    RBC 4.37 11/15/2020    HGB 13.7 11/15/2020    HCT 41.0 11/15/2020     11/15/2020    MCV 93.8 11/15/2020    MCH 31.4 11/15/2020    MCHC 33.4 11/15/2020    RDW 13.1 11/15/2020    LYMPHOPCT 7.0 06/18/2019    MONOPCT 5.3 06/18/2019    BASOPCT 0.3 06/18/2019    MONOSABS 0.86 06/18/2019    LYMPHSABS 1.21 06/18/2019    EOSABS 0.00 06/18/2019    BASOSABS 0.00 06/18/2019     CMP:    Lab Results   Component Value Date     11/15/2020    K 3.7 11/15/2020    K 4.2 06/18/2019     11/15/2020    CO2 20 11/15/2020    BUN 13 11/15/2020    CREATININE 0.9 11/15/2020    GFRAA >60 11/15/2020    LABGLOM >60 11/15/2020    GLUCOSE 112 11/15/2020    PROT 5.8 11/15/2020    LABALBU 3.4 11/15/2020    CALCIUM 8.9 11/15/2020    BILITOT 0.9 11/15/2020    ALKPHOS 70 11/15/2020    AST 19 11/15/2020    ALT 12 11/15/2020     HgBA1c:    Lab Results   Component Value Date    LABA1C 5.7 11/12/2020     FLP:    Lab Results   Component Value Date    TRIG 83 11/12/2020    HDL 45 11/12/2020    [address] 80 11/12/2020    LABVLDL 17 11/12/2020     MRI brain- LMCA stroke     CTH-   1.  Slightly increased volume of, and mass effect related to, acute/subacute,   non-hemorrhagic infarction scattered about the mid/posterior aspect of the   left MCA territory, as described.  Please see above comments.       2.  Otherwise, no significant change.      CTA head/neck   1.  Severe attenuation of numerous M3/M4 branches scattered about the   mid/posterior left MCA territory, extending proximally through the left M1   segment to the level of the origin of the left anterior cerebral artery,   accompanied by moderate collateralization at the level of the left lateral   lenticulostriate vessels.       2. Moderate, predominantly hard atherosclerotic plaque is scattered   throughout the cavernous/supraclinoid segments of the left internal carotid   artery, producing scattered variable, short-segment, mild to moderate   stenoses. Slightly lesser similar findings involve these segments on the   right side, extending through the petrous right ICA segment.  Lesser   atherosclerotic arterial disease, is otherwise most notable at both common   carotid arterial bifurcations, right worse than left, where there are less   than 50% stenoses.  Please see above comments.       3.  Previously-reported acute/subacute, non-hemorrhagic infarction scattered   about the mid/posterior aspect of the left MCA territory is again noted, not   significantly changed since noncontrast head CT obtained 11/14/2020 09:25   hours. I personally reviewed all labs and images today   Assessment:     Left MCA stroke   Vessel imaging with diffuse extra and intracranial atherosclerosis-- no significant stenosis appreciated    Cardio-embolic investigation is underway    His exam shows significant R hemiparesis/plegia, R facial droop, aphasia, dysarthria.  His exam appears slightly worse today with increased lethargy   Plan:     Echo with bubble- completed, results pending     Continue DAPT and high intensity statin    PT OT speech    Risk factor control    Stroke education    Will follow       Imani Neville PA-C  9:59 AM  11/15/2020

## 2020-11-15 NOTE — PLAN OF CARE
Spoke with patient's daughter Guzman Morrell on the phone    All questions answered at this time. Reviewed all testing results that are available so far and next steps.      Adrienne Cr PA-C

## 2020-11-16 ENCOUNTER — APPOINTMENT (OUTPATIENT)
Dept: GENERAL RADIOLOGY | Age: 75
DRG: 065 | End: 2020-11-16
Payer: MEDICARE

## 2020-11-16 LAB
ALBUMIN SERPL-MCNC: 3.3 G/DL (ref 3.5–5.2)
ALP BLD-CCNC: 71 U/L (ref 40–129)
ALT SERPL-CCNC: 14 U/L (ref 0–40)
ANION GAP SERPL CALCULATED.3IONS-SCNC: 11 MMOL/L (ref 7–16)
AST SERPL-CCNC: 18 U/L (ref 0–39)
BILIRUB SERPL-MCNC: 0.8 MG/DL (ref 0–1.2)
BUN BLDV-MCNC: 11 MG/DL (ref 8–23)
CALCIUM SERPL-MCNC: 8.7 MG/DL (ref 8.6–10.2)
CHLORIDE BLD-SCNC: 112 MMOL/L (ref 98–107)
CO2: 20 MMOL/L (ref 22–29)
CREAT SERPL-MCNC: 0.9 MG/DL (ref 0.7–1.2)
GFR AFRICAN AMERICAN: >60
GFR NON-AFRICAN AMERICAN: >60 ML/MIN/1.73
GLUCOSE BLD-MCNC: 113 MG/DL (ref 74–99)
HCT VFR BLD CALC: 40.6 % (ref 37–54)
HEMOGLOBIN: 13.7 G/DL (ref 12.5–16.5)
MCH RBC QN AUTO: 31.8 PG (ref 26–35)
MCHC RBC AUTO-ENTMCNC: 33.7 % (ref 32–34.5)
MCV RBC AUTO: 94.2 FL (ref 80–99.9)
PDW BLD-RTO: 13.2 FL (ref 11.5–15)
PLATELET # BLD: 283 E9/L (ref 130–450)
PMV BLD AUTO: 10.6 FL (ref 7–12)
POTASSIUM SERPL-SCNC: 3.5 MMOL/L (ref 3.5–5)
RBC # BLD: 4.31 E12/L (ref 3.8–5.8)
SODIUM BLD-SCNC: 143 MMOL/L (ref 132–146)
TOTAL PROTEIN: 5.9 G/DL (ref 6.4–8.3)
WBC # BLD: 11.4 E9/L (ref 4.5–11.5)

## 2020-11-16 PROCEDURE — 6360000002 HC RX W HCPCS: Performed by: INTERNAL MEDICINE

## 2020-11-16 PROCEDURE — 97530 THERAPEUTIC ACTIVITIES: CPT

## 2020-11-16 PROCEDURE — 2060000000 HC ICU INTERMEDIATE R&B

## 2020-11-16 PROCEDURE — 2700000000 HC OXYGEN THERAPY PER DAY

## 2020-11-16 PROCEDURE — 97535 SELF CARE MNGMENT TRAINING: CPT

## 2020-11-16 PROCEDURE — U0003 INFECTIOUS AGENT DETECTION BY NUCLEIC ACID (DNA OR RNA); SEVERE ACUTE RESPIRATORY SYNDROME CORONAVIRUS 2 (SARS-COV-2) (CORONAVIRUS DISEASE [COVID-19]), AMPLIFIED PROBE TECHNIQUE, MAKING USE OF HIGH THROUGHPUT TECHNOLOGIES AS DESCRIBED BY CMS-2020-01-R: HCPCS

## 2020-11-16 PROCEDURE — 80053 COMPREHEN METABOLIC PANEL: CPT

## 2020-11-16 PROCEDURE — 2500000003 HC RX 250 WO HCPCS: Performed by: RADIOLOGY

## 2020-11-16 PROCEDURE — 92611 MOTION FLUOROSCOPY/SWALLOW: CPT

## 2020-11-16 PROCEDURE — 2580000003 HC RX 258: Performed by: INTERNAL MEDICINE

## 2020-11-16 PROCEDURE — 36415 COLL VENOUS BLD VENIPUNCTURE: CPT

## 2020-11-16 PROCEDURE — 99223 1ST HOSP IP/OBS HIGH 75: CPT | Performed by: INTERNAL MEDICINE

## 2020-11-16 PROCEDURE — 6370000000 HC RX 637 (ALT 250 FOR IP): Performed by: FAMILY MEDICINE

## 2020-11-16 PROCEDURE — 74230 X-RAY XM SWLNG FUNCJ C+: CPT

## 2020-11-16 PROCEDURE — 99232 SBSQ HOSP IP/OBS MODERATE 35: CPT | Performed by: PHYSICIAN ASSISTANT

## 2020-11-16 PROCEDURE — 85027 COMPLETE CBC AUTOMATED: CPT

## 2020-11-16 PROCEDURE — 97129 THER IVNTJ 1ST 15 MIN: CPT

## 2020-11-16 PROCEDURE — 6370000000 HC RX 637 (ALT 250 FOR IP): Performed by: INTERNAL MEDICINE

## 2020-11-16 PROCEDURE — 92526 ORAL FUNCTION THERAPY: CPT

## 2020-11-16 RX ADMIN — HEPARIN SODIUM 5000 UNITS: 10000 INJECTION INTRAVENOUS; SUBCUTANEOUS at 17:46

## 2020-11-16 RX ADMIN — DEXTROSE AND SODIUM CHLORIDE: 5; 900 INJECTION, SOLUTION INTRAVENOUS at 07:19

## 2020-11-16 RX ADMIN — BARIUM SULFATE 15 ML: 400 PASTE ORAL at 14:52

## 2020-11-16 RX ADMIN — BARIUM SULFATE 15 ML: 400 SUSPENSION ORAL at 14:52

## 2020-11-16 RX ADMIN — SODIUM CHLORIDE, PRESERVATIVE FREE 10 ML: 5 INJECTION INTRAVENOUS at 20:45

## 2020-11-16 RX ADMIN — HEPARIN SODIUM 5000 UNITS: 10000 INJECTION INTRAVENOUS; SUBCUTANEOUS at 01:16

## 2020-11-16 RX ADMIN — ATORVASTATIN CALCIUM 80 MG: 80 TABLET, FILM COATED ORAL at 20:44

## 2020-11-16 RX ADMIN — ASPIRIN 300 MG: 300 SUPPOSITORY RECTAL at 09:28

## 2020-11-16 RX ADMIN — HEPARIN SODIUM 5000 UNITS: 10000 INJECTION INTRAVENOUS; SUBCUTANEOUS at 09:28

## 2020-11-16 ASSESSMENT — PAIN SCALES - GENERAL
PAINLEVEL_OUTOF10: 0

## 2020-11-16 NOTE — CARE COORDINATION
Spoke with patients daughter, Kenna Patterson to provide an update. She confirms she still wants him to go to [place]. SW confirmed they have initiated auth and will let us know when obtained. Daughter had no other questions at this time. Will follow.

## 2020-11-16 NOTE — PROGRESS NOTES
OT BEDSIDE TREATMENT NOTE      Date:2020  Patient Name: Johnathan Land  MRN: [id number]  : 1945  Room: [address] [place] [address]-     Per OT Eval:    Referring Provider: Ana Artis DO     Evaluating OT: Shaylee Perez OTR/L #[id number]     AM-PAC Daily Activity Raw Score:      Modified Kaden Scale   Score     Description  0             No symptoms  1             No significant disability despite symptoms  2             Slight disability; able to look after own affairs  3             Moderate disability; able to ambulate without assist/ requires assist with ADLs  4             Moderate/Severe disability;requires assist to ambulate/assist with ADLs  5             Severe disability;bedridden/incontinent   6               Score:   5     Recommended Adaptive Equipment/DME: To be further assessed       Diagnosis:    1. Cerebrovascular accident (CVA), unspecified mechanism ([facility] [address]       Pertinent Medical History: arthritis, hyperlipidemia     Precautions:  Falls, aphasia (expressive/receptive), R hemiplegia, bed alarm, cast to L wrist d/t surgery with pinning of wrist on 11/3/20 (per daughter)- NWB RUE until clarified by ortho. Home Living: per daughter: Pt lives alone in a 1 story with 3 step(s) to enter and 1 rail(s); bed/bath on 1st   Bathroom setup: tub shower combo  Equipment owned: none     Prior Level of Function: pt was non verbal d/t aphasia, daughter stated pt was Independent  with ADLs , Independent  with IADLs; using no AD for ambulation.  Very active around the house  Driving: yes         Pain Level: none noted  Cognition: A&O: 0/4; non-verbal to a few garbled words, aphasia still noted, pt did not open his eyes even once while seated at EOB but was able to follow 1 step simple command 50% of the time with verbal & tactile cues                Memory:  NT              Sequencing:  poor              Problem solving:  poor              Judgement/safety:  poor                Functional Assessment:    Initial Eval Status  Date: 11/13/20 Treatment Status  Date:  11/16/20 STG=LTG  Time frame: 5-7 days   Feeding Currently NPO  Max A/dep to hold a cup with L hand and bring it to his mouth. NPO    Minimal Assist    Grooming Maximal Assist   Appears to have agnosia- pt attempted to brush his teeth with a comb, max cues and facilitation to use comb appropriately Dep  Hand over hand assist to attempt simple grooming task with Poor results  Minimal Assist    UB Dressing Maximal Assist /dep Max/Dep  Minimal Assist    LB Dressing Dependent   Dep  Donning socks bed level, when asked if he wanted socks off, pt replied \"on\" Moderate Assist    Bathing Dependent/max Hand over hand cues to bathe RUE  Dep  simulated Moderate Assist    Toileting Dependent   Dep  Incontinent of urine per staff Maximal Assist    Bed Mobility  Supine to sit: Moderate Assist   Sit to supine: Maximal Assist   Max A  Supine < > sit   With tactile & verbal cues pt did assist with task  Supine to sit: Minimal Assist   Sit to supine: Minimal Assist    Functional Transfers Sit to stand: Maximal Assist with R knee blocked for improved balance  Stand to sit: Maximal Assist   Stand pivot: NT   Max A  R knee blocked, R UE supported   Completed 5 sit to stand transfers Minimal Assist    Functional Mobility NT     Max A x 2  Side stepping towards HOB TBD   Balance Sitting:     Static:  Min A List to R but able to right self to midline with facilitation    Dynamic:max A  Standing: max A  Sitting:   Static: SBA  Standing: Max A  R knee blocked Sitting:     Static:  SBA    Dynamic:SBA  Standing: min A   Activity Tolerance Poor  Lethargic, attention span improved with sitting EOB  Poor  Lethargic, unable to open eyes  O2 91-94% 2L  Seated at EOB 15-20 minutes  Fair+   Visual/  Perceptual Glasses: yes  Perception: not tested but appeared impaired.  R inattention             BUE  ROM/Strength/  Fine motor Coordination Hand dominance: R     RUE: ROM PROM WFL, trace finger ext, and elbow extension with tapping facilitation. Strength: grossly 1/5      Strength: absent    Coordination:  absent     LUE: ROM  WFL Cast to L wrist     Strength: grossly 3+/5      Strength: NT d/t cast     Coordination: fair-  R UE flaccid no trace of movement noted  RUE: pt to demo improved muscle tone throughout and begin active movement with facilitation.      Pt will be able to position RUE onto pillow with assist from LUE     Education:  Pt was educated through out treatment regarding proper technique & safety with bed mobility, functional transfers & simple ADL re-training/azalia-dressing techniques to ease tasks to improve safety & prevent falls and allow pt to return home safely. Comments: Upon arrival pt was in bed resting, nsg approval for therapy. At end of session pt was returned to bed, HOB elevated, tap system under L side of body, all lines and tubes intact, call light within reach. · Pt has made Poor progress towards set goals. · Continue with current plan of care    Treatment Time In: 1:25            Treatment Time Out: 1:50            Treatment Charges: Mins Units   Ther Ex  10169     Manual Therapy 02812     Thera Activities [id number] 17 1   ADL/Home Mgt 73636 8 1   Neuro Re-ed 54066     Group Therapy      Orthotic manage/training  80796     Non-Billable Time     Total Timed Treatment 25 2       Marva IZQUIERDO  [address] [place] [address], [address] [place] [address]

## 2020-11-16 NOTE — PROGRESS NOTES
SPEECH/LANGUAGE PATHOLOGY  VIDEOFLUOROSCOPIC STUDY OF SWALLOWING (MBS)      PATIENT NAME:  Denny Waters      :  1945          TODAY'S DATE:  2020 ROOM:  8509/8509-B      SUMMARY OF EVALUATION     DYSPHAGIA DIAGNOSIS:  Moderate-marked oropharyngeal dysphagia      DIET RECOMMENDATIONS:  Dysphagia 1, Pureed solids with  pudding consistency (extremely thick) liquids     FEEDING RECOMMENDATIONS:     Assistance level:  Full assistance is needed during all oral intake      Compensatory strategies recommended: Small bites/sips, check oral pocketing    THERAPY RECOMMENDATIONS:      Dysphagia therapy is recommended 3-5 times per week for LOS or when goals are met with emphasis on the following:  Pt will complete oral motor strength/ coordination exercises to improve bolus prep/ control and mastication with moderate cuing. Pt will complete laryngeal strength/ ROM therapeutic exercises to improve airway protection for the least restrictive PO diet with with moderate cuing.                   PROCEDURE     Consistencies Administered During the Evaluation   Liquids: nectar thick liquid and honey thick liquid   Solids:  pureed foods      Method of Intake:   cup, spoon  Fed by clinician      Position:   Seated, upright, Lateral plane    Current Respiratory Status   nasal canula                RESULTS     ORAL STAGE       Inadequate labial seal resulting anterior labial spillage on the right and Oral residuals were noted :  right buccal cavity           PHARYNGEAL STAGE           ONSET TIME     Delayed initiation of the pharyngeal swallow was noted with swallow reflex triggered at the level of the vallecula       PHARYNGEAL RESIDUALS          Vallecula/Pharyngeal Wall           No significant residuals were noted in the vallecula      Pyriform Sinuses      No significant residuals were noted in the pyriform sinuses    LARYNGEAL PENETRATION     Laryngeal penetration occurred in the absence of aspiration DURING the swallow for nectar consistency liquid and honey consistency liquid due to  inadequate laryngeal closure which remained in the laryngeal vestibule. and penetrated deep into the laryngeal vestibule (to the level of the true vocal folds). Laryngeal penetration was mild-moderate and occurred consistently   an absent cough/throat clear was noted                 ASPIRATION    Aspiration  was not present during this evaluation however there is high risk for aspiration  of thin liquid, nectar consistency liquid and honey consistency liquid due to laryngeal penetration         COMPENSATORY STRATEGIES       Compensatory strategies were not attempted      STRUCTURAL/FUNCTIONAL ANOMALIES       No structural/functional anomalies were noted      CERVICAL ESOPHAGEAL STAGE :        The cervical esophagus appeared adequate                            Prognosis for improvements is fair +  This plan will be re-evaluated and revised in 1 week  if warranted. Patient stated goals: Could not state  Treatment goals discussed with Patient   The Patient did not demonstrate understanding of the diagnosis, prognosis and plan of care       CPT code:  91290  dysphagia study      [x]The admitting diagnosis and active problem list, as listed below have been reviewed prior to initiation of this evaluation.      ADMITTING DIAGNOSIS: Acute CVA (cerebrovascular accident) ([address] [I63.9]  Acute CVA (cerebrovascular accident) ([address] [I63.9]  Acute CVA (cerebrovascular accident) ([address] [I63.9]     ACTIVE PROBLEM LIST:   Patient Active Problem List   Diagnosis    Perforated viscus    Diverticulitis of large intestine without bleeding    Acute ischemic cerebrovascular accident (CVA) involving left middle cerebral artery territory ([address]    HLD (hyperlipidemia)    Acute right-sided weakness    Rheumatoid arthritis ([address]    Fall    Altered mental status    Essential hypertension    CAD (coronary artery disease)    Tobacco dependence    Stage 3

## 2020-11-16 NOTE — PROGRESS NOTES
Pt seen for swallowing exercises. Pt appeared fatigued and would not respond to simple questions. Pt unable to say name or birthday when asked. Latent and inconsistent responses provided when asked to follow 1 & 2 step directions. Unable to complete oral motor exercises with max v/c and model. Pt nodded as if he understood but no execution of exercises. Oral stimulation with oral swab completed. Continue with POC.      Kristin Fulton, Graduate Clinician

## 2020-11-16 NOTE — CONSULTS
Cardiac Electrophysiology Consultation Note    Joann Eason  1945  Date of Service: 11/16/2020  PCP: Silvana Rodrigues DO      Reason for Consultation: Cardiac monitoring    SUBJECTIVE: Joann Eason is a 66 yo male who I was asked to see in Cardiac Electrophysiology consultation for cardiac monitoring. He has a medical history significant for hyperlipidemia, hypertension, CKD and rheumatoid arthritis (on methotrexate), COPD, ? coronary artery disease with , perforated bowel in 2019, GERD and arthritis, recently had left wrist reduction 10/27/20    He presented with dysarthria, near aphasia 11/12/20. CT of the Head in the ED showed presence of a left cerebral acute/subacute CVA. Subsequent MRI of brain showed an acute/subacute left MCA distribution infarct. Patient was on Simvastatin at home. No ASA use prior to presentation in patient     ECHO shows LVEF 77%, stage 1 diastolic dysfunction,  he has been started on DAPT by neurology. ECG shows nSR, QRS 90ms, QTC 427ns    Today, he has worsening dysphagia and COVID test pending    I am unable to get any information from him      Past Medical History:  hyperlipidemia, hypertension, CKD and rheumatoid arthritis (on methotrexate), COPD     Social History:  reports that he quit smoking about 4 years ago. His smoking use included cigarettes. He has never used smokeless tobacco. He reports current alcohol use. He reports that he does not use drugs.   Patient Active Problem List    Diagnosis Date Noted    HLD (hyperlipidemia) 11/13/2020    Acute right-sided weakness 11/13/2020    Rheumatoid arthritis ([address] 11/13/2020    Fall 11/13/2020    Altered mental status 11/13/2020    Essential hypertension 11/13/2020    CAD (coronary artery disease) 11/13/2020    Tobacco dependence 11/13/2020    Stage 3 chronic kidney disease 11/13/2020    Dysarthria due to acute cerebellar stroke ([address] 11/13/2020    Acute ischemic cerebrovascular accident (CVA) involving left middle cerebral artery territory [facility]) 2020    Diverticulitis of large intestine without bleeding 2019    Perforated viscus 2019       Past Medical History:   Diagnosis Date    Arthritis     Hyperlipidemia        Past Surgical History:   Procedure Laterality Date    FRACTURE SURGERY         No family history on file.     Social History     Tobacco Use    Smoking status: Former Smoker     Types: Cigarettes     Last attempt to quit: 2016     Years since quittin.5    Smokeless tobacco: Never Used   Substance Use Topics    Alcohol use: Yes     Comment: rare       Current Facility-Administered Medications   Medication Dose Route Frequency Provider Last Rate Last Dose    heparin (porcine) injection 5,000 Units  5,000 Units Subcutaneous Q8H Alis aRwls MD   5,000 Units at 20 0928    barium sulfate 40 % reconsitutable suspension  15 mL Oral ONCE PRN Heather Velez MD        sodium chloride flush 0.9 % injection 10 mL  10 mL Intravenous PRN Yi Asif II, MD   10 mL at 20 1453    dextrose 5 % and 0.9 % sodium chloride infusion   Intravenous Continuous Alis Rawls MD 70 mL/hr at 20 0719      aspirin suppository 300 mg  300 mg Rectal Daily Ciro Shirley MD   300 mg at 20 1752    clopidogrel (PLAVIX) tablet 75 mg  75 mg Oral Daily Apryl Leonardo MD   75 mg at 11/15/20 0060    perflutren lipid microspheres (DEFINITY) injection 1.65 mg  1.5 mL Intravenous ONCE PRN Marychuy Amen, DO        sodium chloride flush 0.9 % injection 10 mL  10 mL Intravenous 2 times per day Marychuy Amen, DO   10 mL at 11/15/20 8666    sodium chloride flush 0.9 % injection 10 mL  10 mL Intravenous PRN Marychuy Amen, DO        acetaminophen (TYLENOL) tablet 650 mg  650 mg Oral Q6H PRN Marychuy Amen, DO   650 mg at 20 0135    Or    acetaminophen (TYLENOL) suppository 650 mg  650 mg Rectal Q6H PRN Vera Sims DO   650 mg at 11/14/20 0953    polyethylene glycol (GLYCOLAX) packet 17 g  17 g Oral Daily PRN Gweneth Class, DO        atorvastatin (LIPITOR) tablet 80 mg  80 mg Oral Nightly [facility] MILLI Sims DO   80 mg at 11/14/20 2150    aspirin chewable tablet 81 mg  81 mg Oral Daily Vera Sims DO   81 mg at 11/15/20 0833    ipratropium-albuterol (DUONEB) nebulizer solution 1 ampule  1 ampule Inhalation Q4H PRN Joi Carrizales MD            No Known Allergies    ROS:   Unable to obtain due to mental status        PHYSICAL EXAM:  Vitals:    11/15/20 1800 11/15/20 2245 11/16/20 0645 11/16/20 0800   BP: (!) 142/80 121/80  139/73   Pulse: 73 73  72   Resp: 20 25  22   Temp: 98 °F (36.7 °C) 97.6 °F (36.4 °C)  98.5 °F (36.9 °C)   TempSrc: Temporal Temporal  Temporal   SpO2: 94% 94%  95%   Weight:   191 lb 11.2 oz (87 kg)    Height:         Constitutional: in mild distress  Head: Normocephalic and atraumatic. Eyes: Conjunctivae are normal.   Neck:  no JVD present. Cardiovascular: S1 normal, S2 normal and intact distal pulses. A regular rhythm present. Pulmonary/Chest: Effort normal and breath sounds abnormal. Mild respiratory distress. Abdominal: Soft. Normal appearance     Musculoskeletal: Unable to assess  Neurological: Unable to assess. He opens eyes but does not repond  Skin: Skin is warm and dry. No bruising, no ecchymosis and no rash noted. Extremity: No visible clubbing or cyanosis. No edema.   Psychiatric: Unable to assess    Pertinent Labs:  CBC:   Recent Labs     11/14/20  0510 11/15/20  0503 11/16/20  0503   WBC 11.9* 10.2 11.4   HGB 13.6 13.7 13.7   HCT 41.4 41.0 40.6    256 283     BMP:  Recent Labs     11/14/20  0510 11/15/20  0503 11/16/20  0503    144 143   K 3.9 3.7 3.5    110* 112*   CO2 22 20* 20*   BUN 18 13 11   CREATININE 1.0 0.9 0.9   CALCIUM 8.8 8.9 8.7       TSH:   Lab Results   Component Value Date    TSH 0.355 11/12/2020        Lipid Profile:   Lab Results   Component Value Date    TRIG 83 11/12/2020    HDL 45 11/12/2020    LDLCALC 80 11/12/2020    CHOL 142 11/12/2020      Xray:   XR CHEST PORTABLE   Final Result   1. Faint ill-defined opacities in the medial left lower lung. Possibly   reflects underlying infection. Aspiration could have this appearance. 2.  Coarse interstitial markings and atherosclerotic disease. CTA HEAD W CONTRAST   Final Result   1. Severe attenuation of numerous M3/M4 branches scattered about the   mid/posterior left MCA territory, extending proximally through the left M1   segment to the level of the origin of the left anterior cerebral artery,   accompanied by moderate collateralization at the level of the left lateral   lenticulostriate vessels. 2. Moderate, predominantly hard atherosclerotic plaque is scattered   throughout the cavernous/supraclinoid segments of the left internal carotid   artery, producing scattered variable, short-segment, mild to moderate   stenoses. Slightly lesser similar findings involve these segments on the   right side, extending through the petrous right ICA segment. Lesser   atherosclerotic arterial disease, is otherwise most notable at both common   carotid arterial bifurcations, right worse than left, where there are less   than 50% stenoses. Please see above comments. 3.  Previously-reported acute/subacute, non-hemorrhagic infarction scattered   about the mid/posterior aspect of the left MCA territory is again noted, not   significantly changed since noncontrast head CT obtained 11/14/2020 09:25   hours. .         CTA NECK W CONTRAST   Final Result   1. Severe attenuation of numerous M3/M4 branches scattered about the   mid/posterior left MCA territory, extending proximally through the left M1   segment to the level of the origin of the left anterior cerebral artery,   accompanied by moderate collateralization at the level of the left lateral   lenticulostriate vessels.       2. Moderate, is doppler evidence of stage I diastolic dysfunction. Normal right ventricular size and function. Normal sized left atrium. Interatrial septum appears intact. Agitated saline injected for shunt evaluation: study was suboptimal but   appeared grossly negative for shunting at the level of the interatrial   septum. 11/12/20 MRI Brain  Acute subacute left MCA distribution infarct.  Negative for hemorrhagic    transformation. Rpnkeswdw35/16/2020: NSR    ECG 11/16/2020: normal sinus rhythm    I have independently reviewed all of the ECGs and rhythm strips per above. I have personally reviewed the laboratory, cardiac diagnostic and radiographic testing as outlined above. I have reviewed previous records noted in [address] [facility]. Impression:    1. Acute Stroke  - Acute left MCA distribution infarct  - DAPT started by neurology  - Echo shows normal LVEF  - Recommend BRENDA with bubble study by cardiology  - Recommend 30 day event monitor, if negative, we can proceed with ILR implant. This was discussed with daughter and she is agreeable with the plan    2. CAD  - Unknown details  - LVEF normal  - Recommend BRENDA with bubble study    3. Dyslipidemia  - Continue statin    4. Hypertension  - blood pressure stable    5. Rheumatoid arthritis  - On methotrexate and as needed steroid    6. COPD   - chronic bronchitis    Recommendations:    1. I will arrange for 30 day event monitor and office follow up  2. If Monitor is negative, recommend ILR implant  3. Recommend BRENDA with cardiology      I have spent a total of 110 minutes with the patient and his/her family reviewing the above stated recommendations. A total of >50% of that time involved face-to-face time providing counseling and or coordination of care with the other providers. Thank you for allowing me to participate in your patient's care.     [facility]    NOTE: This report was transcribed using voice recognition software. Every effort was made to ensure accuracy; however, inadvertent computerized transcription errors may be present.

## 2020-11-16 NOTE — PROGRESS NOTES
Notified Dr. Stan Mishra of patients dysphagia worsening and CXR results. Also, to see if ortho is needed to assess patients left arm splint.

## 2020-11-16 NOTE — PROGRESS NOTES
Physical Therapy  Treatment Note    Name: Angely Carver  : 1945  MRN: [id number]    Referring Provider:  Stanislaw Florian DO    Date of Service: 2020    Evaluating PT:  Tima Floresita PT, DPT PT [id number]    Room #:  3740/0992-B  Diagnosis:  L MCA CVA  PMHx/PSHx:  Arthritis, HLD  Procedure/Surgery:    Precautions:  R Hemiparesis, Falls, Aphasia, L Wrist Fx 11/3/2020   Equipment Needs:  TBD    SUBJECTIVE:    Pt lives alone in a 1 story home with 3 stairs to enter and single rail. Bed is on first floor and bath is on first floor. Pt ambulated with no device independently PTA. Pt independent and very active PTA. Equipment Owned:  None    OBJECTIVE:   Initial Evaluation  Date: 2020 Treatment  Date: 2020 Short Term/ Long Term   Goals   AM-PAC 6 Clicks  96/96    Was pt agreeable to Eval/treatment? Yes yes    Does pt have pain? No c/o pain (no facial grimace noted) No signs of pain    Bed Mobility  Rolling: Brian  Supine to sit: ModA  Sit to supine: Nt  Scooting: ModA Rolling : MaxA  Supine to sit MaxA  Sit to supine MaxA Rolling: Independent  Supine to sit: Independent  Sit to supine: Independent  Scooting: Independent     Transfers Sit to stand: ModA x 2  Stand to sit: ModA x 2  Stand pivot: MaxA x 2 no AD  Sit<>stand: maxA with R knee block  Stand pivot N/T Sit to stand: Brian    Stand to sit: Brian    Stand pivot: Brian AAD   Ambulation    3 feet with MaxA x 2 HHA 1 side step MaxA with R knee blocked. >25 feet with Brian  AAD   Stair negotiation: ascended and descended  NT** NT    ROM BUE:  See OT Note  BLE:  WFL     Strength BUE:  See OT Note  RLE: 2/5 hip and knee  No ankle AROM observed  LLE: 3/5 RLE: no active muscle movement noted in RLE Improve Strength 1/3 MMT Grade   Balance Sitting EOB:  Brian  Dynamic Standing:  MaxA x 2 HHA Sitting EOB: Brian  Dynamic standing: maxA Sitting EOB:  Independent    Dynamic Standing:  Brian AAD     Pt is A & O x unable to assess d/t aphasia.   Pt following approximately 75% of commands  Sensation:  Pt denies numbness and tingling to extremities  Edema:  unremarkable      Patient education  Pt educated on role of PT intervention. Pt educated on safety in room with utilization of call light for assistance with mobility. Patient response to education:   Pt verbalized understanding Pt demonstrated skill Pt requires further education in this area   yes yes yes     ASSESSMENT:    Comments:  RN cleared pt for activity prior to session. Pt received supine in bed and agreeable to PT intervention at this time. Pt's O2 off and reapplied. Pt's O2 checked and 93% at rest on 2 liters. Pt performed all functional mobility as noted above. Pt did not open eyes throughout tx session but able to follow commands and participate with increased time. Pt sat EOB for 10 minutes. Pt stood x5 reps and sidestepped 1 time toward HOB. Pt required R UE supported and R knee blocked. Pt's O2 increased to 95%. No noted purposeful movement of R LE. Pt returned to bed and repositioned with TAPs and pillows. Bed alarm applied. Treatment:  Patient practiced and was instructed in the following treatment:     Therapeutic Activities Completed:  o Functional mobility as noted above:   - Bed mobility: maxA all aspects. Max VC and TC required. - Transfer training: sit<>stand maxA x 5 reps with R knee block. Attempted side steps, but pt was unable to advance RLE and could only complete 1 sidestep.     o Skilled repositioning in bed with pillows and TAPs. o Pt education as noted above. PLAN:    Patient is making fair progress towards established goals. Will continue with current POC.       Time in 13:05  Time out  13:30    Total Treatment Time  25 minutes     CPT codes:  [] Gait training 39777 - minutes  [] Manual therapy 44398 - minutes  [x] Therapeutic activities 31049 25 minutes  [] Therapeutic exercises 12263 - minutes  [] Neuromuscular reeducation 33994 - minutes    Ghazala Joseph [id number]

## 2020-11-16 NOTE — PROGRESS NOTES
Daughter, Troy Kumar called and in and was updated on patient's NPO status and increased activity during the night.

## 2020-11-16 NOTE — PROGRESS NOTES
Noel Maya is a 67 y.o.  male     Neurology is following for stroke. Past medical history is significant for hyperlipidemia, hypertension, CKD and rheumatoid arthritis (on methotrexate), COPD    Patient presented to outside hospital with dysarthria and right-sided weakness. He was found to have a left MCA stroke and transferred here for further evaluation. He was outside the window for TPA. MRI of the brain revealed a left MCA stroke. 11/14 patient had worsening of right arm weakness and he was sent for stat CT head. Repeat CTH shows evolving LMCA infarct. No new areas identified. No hemorrhage. Vessel imaging was obtained and suggestive of moderate diffuse atherosclerotic disease -- no significant stenosis identified    Echo with bubble unrevealing for cardio-embolic source    EP has been consulted for monitoring    No Afib on tele here     He is lethargic today. Now NPO. He remains aphasic and R hemiplegic/paretic.      ROS unable due to aphasia     Current Facility-Administered Medications   Medication Dose Route Frequency Provider Last Rate Last Dose    heparin (porcine) injection 5,000 Units  5,000 Units Subcutaneous Q8H Doc Lopez MD   5,000 Units at 11/16/20 0928    barium sulfate 40 % reconsitutable suspension  15 mL Oral ONCE PRN Kirsten Frazier MD        sodium chloride flush 0.9 % injection 10 mL  10 mL Intravenous PRN Portia Rodriguez II, MD   10 mL at 11/14/20 1453    dextrose 5 % and 0.9 % sodium chloride infusion   Intravenous Continuous Doc Lopez MD 70 mL/hr at 11/16/20 0719      aspirin suppository 300 mg  300 mg Rectal Daily Victorino Ron MD   300 mg at 11/16/20 3020    clopidogrel (PLAVIX) tablet 75 mg  75 mg Oral Daily Alpheus Epley, MD   75 mg at 11/15/20 3397    perflutren lipid microspheres (DEFINITY) injection 1.65 mg  1.5 mL Intravenous ONCE PRN Ronen Hand, DO        sodium chloride flush 0.9 % injection 10 mL  10 mL Intravenous 2 times per day Sara Gayle DO   10 mL at 11/15/20 3141    sodium chloride flush 0.9 % injection 10 mL  10 mL Intravenous PRN Sara Gayle DO        acetaminophen (TYLENOL) tablet 650 mg  650 mg Oral Q6H PRN Sara Gayle DO   650 mg at 11/14/20 0135    Or    acetaminophen (TYLENOL) suppository 650 mg  650 mg Rectal Q6H PRN Sara Gayle DO   650 mg at 11/14/20 4451    polyethylene glycol (GLYCOLAX) packet 17 g  17 g Oral Daily PRN Sara Gayle DO        atorvastatin (LIPITOR) tablet 80 mg  80 mg Oral Nightly Jaimie Gannon Levi DO   80 mg at 11/14/20 2150    aspirin chewable tablet 81 mg  81 mg Oral Daily [facility] MILLI Levi DO   81 mg at 11/15/20 1973    ipratropium-albuterol (DUONEB) nebulizer solution 1 ampule  1 ampule Inhalation Q4H PRN Roxi Andino MD             Objective:       /73   Pulse 72   Temp 98.5 °F (36.9 °C) (Temporal)   Resp 22   Ht 5' 11\" (1.803 m)   Wt 191 lb 11.2 oz (87 kg)   SpO2 95%   BMI 26.74 kg/m²        General appearance: alert, appears stated age and cooperative  Head: Normocephalic, without obvious abnormality, atraumatic  Eyes: conjunctivae/corneas clear. Neck: no adenopathy, no carotid bruit, no JVD, supple  Lungs: clear to auscultation bilaterally  Heart: regular rate and rhythm, S1, S2 normal, no murmur, click, rub or gallop  Extremities: extremities normal, atraumatic, no cyanosis or edema  Pulses: 2+ and symmetric  Skin: Skin color, texture, turgor normal. No rashes or lesions     Mental Status: Lethargic.  Follows very few commands for me today-- opens eyes, squeezes with L hand, wiggles toes on L     Speech: Severe dysarthria   Language: Globally aphasic     Cranial Nerves:  I: smell    II: visual acuity     II: visual fields Bilateral threat    II: pupils REX   III,VII: ptosis None   III,IV,VI: extraocular muscles  Full ROM   V: mastication Normal   V: facial light touch sensation     V,VII: corneal reflex     VII: facial muscle function - upper  Normal   VII: facial muscle function - lower R 7th U[facility] paresis    VIII: hearing Normal   IX: soft palate elevation  Normal   IX,X: gag reflex    XI: trapezius strength     XI: sternocleidomastoid strength    XI: neck extension strength     XII: tongue strength  Normal     Motor:  RUE 0/5   RLE 2/5  Moves L side spontaneously and intermittently to command     No abnormal movements     Sensory:  No response to pain in RUE; minimally withdraws to RLE     DTR:     R [place]is    No pathological reflexes    Laboratory/Radiology:     CBC with Differential:    Lab Results   Component Value Date    WBC 11.4 11/16/2020    RBC 4.31 11/16/2020    HGB 13.7 11/16/2020    HCT 40.6 11/16/2020     11/16/2020    MCV 94.2 11/16/2020    MCH 31.8 11/16/2020    MCHC 33.7 11/16/2020    RDW 13.2 11/16/2020    LYMPHOPCT 7.0 06/18/2019    MONOPCT 5.3 06/18/2019    BASOPCT 0.3 06/18/2019    MONOSABS 0.86 06/18/2019    LYMPHSABS 1.21 06/18/2019    EOSABS 0.00 06/18/2019    BASOSABS 0.00 06/18/2019     CMP:    Lab Results   Component Value Date     11/16/2020    K 3.5 11/16/2020    K 4.2 06/18/2019     11/16/2020    CO2 20 11/16/2020    BUN 11 11/16/2020    CREATININE 0.9 11/16/2020    GFRAA >60 11/16/2020    LABGLOM >60 11/16/2020    GLUCOSE 113 11/16/2020    PROT 5.9 11/16/2020    LABALBU 3.3 11/16/2020    CALCIUM 8.7 11/16/2020    BILITOT 0.8 11/16/2020    ALKPHOS 71 11/16/2020    AST 18 11/16/2020    ALT 14 11/16/2020     HgBA1c:    Lab Results   Component Value Date    LABA1C 5.7 11/12/2020     FLP:    Lab Results   Component Value Date    TRIG 83 11/12/2020    HDL 45 11/12/2020    [address] 80 11/12/2020    LABVLDL 17 11/12/2020     MRI brain- LMCA stroke     CTH-   1.  Slightly increased volume of, and mass effect related to, acute/subacute,   non-hemorrhagic infarction scattered about the mid/posterior aspect of the   left MCA territory, as described.  Please see above comments.       2. Cardio-embolic investigation is underway    His exam shows significant R hemiparesis/plegia, R facial droop, aphasia, dysarthria.    Plan:     EP consult for extended cardiac monitoring     Continue DAPT and high intensity statin    PT OT speech    Risk factor control    Stroke education    Okay to d/c from neuro POV after EP evaluates     Marychuy Bartholomew PA-C  1:25 PM  11/16/2020

## 2020-11-16 NOTE — PROGRESS NOTES
Hospitalist Progress Note      PCP: Estela German DO    Date of Admission: 11/12/2020    Chief Complaint: Unable to obtain    Hospital Course: This is a 79-year-old male with past medical history of CKD, hypertension, CAD, COPD, rheumatoid arthritis on methotrexate who presented to the emergency department with right sided weakness, aphasia, dysarthria. CT head showed acute/subacute left MCA distribution infarct; he was outside the window of TPA. Subsequent MRI brain confirmed left MCA stroke. Echocardiogram with bubble study was negative for cardioembolic source. Subjective: Pt was seen and examined at bedside. No acute event overnight; unable to complete review of systems due to aphasic state. Medications:  Reviewed    Infusion Medications    dextrose 5 % and 0.9 % NaCl 70 mL/hr at 11/16/20 0719     Scheduled Medications    heparin (porcine)  5,000 Units Subcutaneous Q8H    aspirin  300 mg Rectal Daily    clopidogrel  75 mg Oral Daily    sodium chloride flush  10 mL Intravenous 2 times per day    atorvastatin  80 mg Oral Nightly    aspirin  81 mg Oral Daily     PRN Meds: barium sulfate, sodium chloride flush, perflutren lipid microspheres, sodium chloride flush, acetaminophen **OR** acetaminophen, polyethylene glycol, ipratropium-albuterol      Intake/Output Summary (Last 24 hours) at 11/16/2020 1745  Last data filed at 11/16/2020 0719  Gross per 24 hour   Intake 1065.31 ml   Output --   Net 1065.31 ml       Exam:    /60   Pulse 70   Temp 98.1 °F (36.7 °C) (Temporal)   Resp (!) 36   Ht 5' 11\" (1.803 m)   Wt 191 lb 11.2 oz (87 kg)   SpO2 94%   BMI 26.74 kg/m²     General appearance: Lying comfortably in bed. No apparent distress. Respiratory: Clear to auscultation bilaterally. Cardiovascular: Normal S1/S2. Regular rhythm and rate. Abdomen: Soft, non-tender, non-distended with normal bowel sounds. Musculoskeletal: No clubbing, cyanosis or edema bilaterally.    Neurologic: otherwise most notable at both common   carotid arterial bifurcations, right worse than left, where there are less   than 50% stenoses. Please see above comments. 3.  Previously-reported acute/subacute, non-hemorrhagic infarction scattered   about the mid/posterior aspect of the left MCA territory is again noted, not   significantly changed since noncontrast head CT obtained 11/14/2020 09:25   hours. .         CTA NECK W CONTRAST   Final Result   1. Severe attenuation of numerous M3/M4 branches scattered about the   mid/posterior left MCA territory, extending proximally through the left M1   segment to the level of the origin of the left anterior cerebral artery,   accompanied by moderate collateralization at the level of the left lateral   lenticulostriate vessels. 2. Moderate, predominantly hard atherosclerotic plaque is scattered   throughout the cavernous/supraclinoid segments of the left internal carotid   artery, producing scattered variable, short-segment, mild to moderate   stenoses. Slightly lesser similar findings involve these segments on the   right side, extending through the petrous right ICA segment. Lesser   atherosclerotic arterial disease, is otherwise most notable at both common   carotid arterial bifurcations, right worse than left, where there are less   than 50% stenoses. Please see above comments. 3.  Previously-reported acute/subacute, non-hemorrhagic infarction scattered   about the mid/posterior aspect of the left MCA territory is again noted, not   significantly changed since noncontrast head CT obtained 11/14/2020 09:25   hours. .         FL MODIFIED BARIUM SWALLOW W VIDEO   Final Result   Positive aspiration to nectar consistencies of barium contrast      Please see separate speech pathology report for full discussion of findings   and recommendations. CT HEAD WO CONTRAST   Final Result   1.   Slightly increased volume of, and mass effect related to,

## 2020-11-17 ENCOUNTER — APPOINTMENT (OUTPATIENT)
Dept: CT IMAGING | Age: 75
DRG: 065 | End: 2020-11-17
Payer: MEDICARE

## 2020-11-17 LAB
ALBUMIN SERPL-MCNC: 3.2 G/DL (ref 3.5–5.2)
ALP BLD-CCNC: 72 U/L (ref 40–129)
ALT SERPL-CCNC: 16 U/L (ref 0–40)
ANION GAP SERPL CALCULATED.3IONS-SCNC: 9 MMOL/L (ref 7–16)
AST SERPL-CCNC: 18 U/L (ref 0–39)
BILIRUB SERPL-MCNC: 0.9 MG/DL (ref 0–1.2)
BUN BLDV-MCNC: 12 MG/DL (ref 8–23)
CALCIUM SERPL-MCNC: 8.7 MG/DL (ref 8.6–10.2)
CHLORIDE BLD-SCNC: 112 MMOL/L (ref 98–107)
CO2: 23 MMOL/L (ref 22–29)
CREAT SERPL-MCNC: 0.9 MG/DL (ref 0.7–1.2)
GFR AFRICAN AMERICAN: >60
GFR NON-AFRICAN AMERICAN: >60 ML/MIN/1.73
GLUCOSE BLD-MCNC: 119 MG/DL (ref 74–99)
HCT VFR BLD CALC: 39 % (ref 37–54)
HEMOGLOBIN: 13 G/DL (ref 12.5–16.5)
MCH RBC QN AUTO: 31.8 PG (ref 26–35)
MCHC RBC AUTO-ENTMCNC: 33.3 % (ref 32–34.5)
MCV RBC AUTO: 95.4 FL (ref 80–99.9)
PDW BLD-RTO: 13.3 FL (ref 11.5–15)
PLATELET # BLD: 274 E9/L (ref 130–450)
PMV BLD AUTO: 10.6 FL (ref 7–12)
POTASSIUM SERPL-SCNC: 3.5 MMOL/L (ref 3.5–5)
RBC # BLD: 4.09 E12/L (ref 3.8–5.8)
SARS-COV-2: NOT DETECTED
SODIUM BLD-SCNC: 144 MMOL/L (ref 132–146)
SOURCE: NORMAL
TOTAL PROTEIN: 5.7 G/DL (ref 6.4–8.3)
WBC # BLD: 9.6 E9/L (ref 4.5–11.5)

## 2020-11-17 PROCEDURE — 97530 THERAPEUTIC ACTIVITIES: CPT | Performed by: PHYSICAL THERAPIST

## 2020-11-17 PROCEDURE — 6360000002 HC RX W HCPCS: Performed by: INTERNAL MEDICINE

## 2020-11-17 PROCEDURE — 36415 COLL VENOUS BLD VENIPUNCTURE: CPT

## 2020-11-17 PROCEDURE — 70450 CT HEAD/BRAIN W/O DYE: CPT

## 2020-11-17 PROCEDURE — 85027 COMPLETE CBC AUTOMATED: CPT

## 2020-11-17 PROCEDURE — 6370000000 HC RX 637 (ALT 250 FOR IP): Performed by: INTERNAL MEDICINE

## 2020-11-17 PROCEDURE — 92526 ORAL FUNCTION THERAPY: CPT

## 2020-11-17 PROCEDURE — 6370000000 HC RX 637 (ALT 250 FOR IP): Performed by: FAMILY MEDICINE

## 2020-11-17 PROCEDURE — 97535 SELF CARE MNGMENT TRAINING: CPT

## 2020-11-17 PROCEDURE — 2580000003 HC RX 258: Performed by: INTERNAL MEDICINE

## 2020-11-17 PROCEDURE — 2060000000 HC ICU INTERMEDIATE R&B

## 2020-11-17 PROCEDURE — 2700000000 HC OXYGEN THERAPY PER DAY

## 2020-11-17 PROCEDURE — 80053 COMPREHEN METABOLIC PANEL: CPT

## 2020-11-17 RX ADMIN — ACETAMINOPHEN 650 MG: 325 TABLET ORAL at 04:00

## 2020-11-17 RX ADMIN — ASPIRIN 300 MG: 300 SUPPOSITORY RECTAL at 09:41

## 2020-11-17 RX ADMIN — HEPARIN SODIUM 5000 UNITS: 10000 INJECTION INTRAVENOUS; SUBCUTANEOUS at 02:02

## 2020-11-17 RX ADMIN — CLOPIDOGREL 75 MG: 75 TABLET, FILM COATED ORAL at 09:41

## 2020-11-17 RX ADMIN — ACETAMINOPHEN 650 MG: 650 SUPPOSITORY RECTAL at 23:55

## 2020-11-17 RX ADMIN — HEPARIN SODIUM 5000 UNITS: 10000 INJECTION INTRAVENOUS; SUBCUTANEOUS at 17:20

## 2020-11-17 RX ADMIN — HEPARIN SODIUM 5000 UNITS: 10000 INJECTION INTRAVENOUS; SUBCUTANEOUS at 09:41

## 2020-11-17 RX ADMIN — ATORVASTATIN CALCIUM 80 MG: 80 TABLET, FILM COATED ORAL at 20:35

## 2020-11-17 RX ADMIN — DEXTROSE AND SODIUM CHLORIDE: 5; 900 INJECTION, SOLUTION INTRAVENOUS at 17:20

## 2020-11-17 ASSESSMENT — PAIN SCALES - GENERAL
PAINLEVEL_OUTOF10: 0
PAINLEVEL_OUTOF10: 0
PAINLEVEL_OUTOF10: 6
PAINLEVEL_OUTOF10: 0
PAINLEVEL_OUTOF10: 6

## 2020-11-17 NOTE — PROGRESS NOTES
Hospitalist Progress Note      PCP: Jose Eduardo Blackburn DO    Date of Admission: 11/12/2020    Chief Complaint: Unable to obtain    Hospital Course: This is a 48-year-old male with past medical history of CKD, hypertension, CAD, COPD, rheumatoid arthritis on methotrexate who presented to the emergency department with right sided weakness, aphasia, dysarthria. CT head showed acute/subacute left MCA distribution infarct; he was outside the window of TPA. Subsequent MRI brain confirmed left MCA stroke. Echocardiogram with bubble study was negative for cardioembolic source. Subjective: Pt was seen and examined at bedside. No acute event overnight; unable to complete review of systems due to aphasic state. Medications:  Reviewed    Infusion Medications    dextrose 5 % and 0.9 % NaCl 70 mL/hr at 11/17/20 0649     Scheduled Medications    heparin (porcine)  5,000 Units Subcutaneous Q8H    aspirin  300 mg Rectal Daily    clopidogrel  75 mg Oral Daily    sodium chloride flush  10 mL Intravenous 2 times per day    atorvastatin  80 mg Oral Nightly    aspirin  81 mg Oral Daily     PRN Meds: barium sulfate, sodium chloride flush, perflutren lipid microspheres, sodium chloride flush, acetaminophen **OR** acetaminophen, polyethylene glycol, ipratropium-albuterol      Intake/Output Summary (Last 24 hours) at 11/17/2020 1144  Last data filed at 11/17/2020 0649  Gross per 24 hour   Intake 1645 ml   Output 1 ml   Net 1644 ml       Exam:    /83   Pulse 66   Temp 97.5 °F (36.4 °C) (Temporal)   Resp 22   Ht 5' 11\" (1.803 m)   Wt 192 lb 1.6 oz (87.1 kg)   SpO2 98%   BMI 26.79 kg/m²     General appearance: Lying comfortably in bed. No apparent distress. Respiratory: Clear to auscultation bilaterally. Cardiovascular: Normal S1/S2. Regular rhythm and rate. Abdomen: Soft, non-tender, non-distended with normal bowel sounds. Musculoskeletal: No clubbing, cyanosis or edema bilaterally.    Neurologic: Aphasia, notable at both common   carotid arterial bifurcations, right worse than left, where there are less   than 50% stenoses. Please see above comments. 3.  Previously-reported acute/subacute, non-hemorrhagic infarction scattered   about the mid/posterior aspect of the left MCA territory is again noted, not   significantly changed since noncontrast head CT obtained 11/14/2020 09:25   hours. .         CTA NECK W CONTRAST   Final Result   1. Severe attenuation of numerous M3/M4 branches scattered about the   mid/posterior left MCA territory, extending proximally through the left M1   segment to the level of the origin of the left anterior cerebral artery,   accompanied by moderate collateralization at the level of the left lateral   lenticulostriate vessels. 2. Moderate, predominantly hard atherosclerotic plaque is scattered   throughout the cavernous/supraclinoid segments of the left internal carotid   artery, producing scattered variable, short-segment, mild to moderate   stenoses. Slightly lesser similar findings involve these segments on the   right side, extending through the petrous right ICA segment. Lesser   atherosclerotic arterial disease, is otherwise most notable at both common   carotid arterial bifurcations, right worse than left, where there are less   than 50% stenoses. Please see above comments. 3.  Previously-reported acute/subacute, non-hemorrhagic infarction scattered   about the mid/posterior aspect of the left MCA territory is again noted, not   significantly changed since noncontrast head CT obtained 11/14/2020 09:25   hours. .         FL MODIFIED BARIUM SWALLOW W VIDEO   Final Result   Positive aspiration to nectar consistencies of barium contrast      Please see separate speech pathology report for full discussion of findings   and recommendations. CT HEAD WO CONTRAST   Final Result   1.   Slightly increased volume of, and mass effect related to, acute/subacute, non-hemorrhagic infarction scattered about the mid/posterior aspect of the   left MCA territory, as described. Please see above comments. 2.  Otherwise, no significant change. Milady Ferguson RECOMMENDATIONS:   1. Recommend follow-up CT versus MR imaging of the brain, as directed   clinically. Milady Dye MRI BRAIN WO CONTRAST   Final Result   Acute subacute left MCA distribution infarct. Negative for hemorrhagic   transformation. CT HEAD WO CONTRAST    (Results Pending)         Active Hospital Problems    Diagnosis Date Noted    HLD (hyperlipidemia) [E78.5] 11/13/2020    Acute right-sided weakness [R53.1] 11/13/2020    Rheumatoid arthritis ([facility] [address] [M06.9] 11/13/2020    Fall [M93. XXXA] 11/13/2020    Altered mental status [R41.82] 11/13/2020    Essential hypertension [I10] 11/13/2020    CAD (coronary artery disease) [I25.10] 11/13/2020    Tobacco dependence [F17.200] 11/13/2020    Stage 3 chronic kidney disease [N18.30] 11/13/2020    Dysarthria due to acute cerebellar stroke ([facility] [address] [I63.9, R47.1] 11/13/2020    Acute ischemic cerebrovascular accident (CVA) involving left middle cerebral artery territory [facility]) [I63.512] 11/12/2020       Assessment  Acute left MCA ischemic infarct - still has residual aphasia, dysphagia  Acute encephalopathy - secondary to above  Hyperlipidemia  Hypertension  CAD  COPD  Rheumatoid arthritis -  on methotrexate      Plan:  Continue with DAPT, Statin  Appreciate EP consult, 30-day event monitor recommended. Will recommend ILR implant if event monitor negative  Gentle IV hydration  Diet with consistency as recommended by SLP  Continue with PT/ST/SLP treatment as tolerated  Discharge planning discussed with case management, awaiting pre-CERT to [facility] rehab facility    DVT Prophylaxis: Subcutaneous heparin  Diet: DIET DYSPHAGIA PUREED; Dysphagia Pureed;  Extremely Thick (Spoon-Pudding)  Code Status: Full Code    PT/OT Eval Status: Ongoing    Dispo -  inpatient

## 2020-11-17 NOTE — PROGRESS NOTES
Physical Therapy  Facility/Department: [address] NEURO IMCU  Daily Treatment Note  NAME: Sujatha Parada  : 1945  MRN: [id number]    Date of Service: 2020    Referring Provider:  DO Kike Cordero PT:  Halie Ulloa PT, DPT PT [id number]     Room #:  9871/0052-U  Diagnosis:  L MCA CVA  PMHx/PSHx:  Arthritis, HLD  Procedure/Surgery:    Precautions:  R Hemiparesis, Falls, Aphasia, L Wrist Fx 11/3/2020   Equipment Needs:  TBD     SUBJECTIVE:     Pt lives alone in a 1 story home with 3 stairs to enter and single rail. Bed is on first floor and bath is on first floor. Pt ambulated with no device independently PTA. Pt independent and very active PTA. Equipment Owned:  None     OBJECTIVE:    Initial Evaluation  Date: 2020 Treatment  Date: 2020 Short Term/ Long Term   Goals   AM-PAC 6 Clicks  88/51     Was pt agreeable to Eval/treatment? Yes yes     Does pt have pain? No c/o pain (no facial grimace noted) No signs of pain     Bed Mobility  Rolling: Jessie  Supine to sit: ModA  Sit to supine: Nt  Scooting: ModA Rolling : Jessie  Supine to sit ModA  Sit to supine ModA Rolling: Independent  Supine to sit: Independent  Sit to supine: Independent  Scooting: Independent      Transfers Sit to stand: ModA x 2  Stand to sit: ModA x 2  Stand pivot: MaxA x 2 no AD  Sit to stand: Max A with R knee block  Stand to sit: Max A  Stand pivot N/T Sit to stand: Jessie     Stand to sit: Jessie     Stand pivot: Jessie AAD   Ambulation    3 feet with MaxA x 2 HHA 1 side step MaxA with R knee blocked and progression of ea LE >25 feet with Jessie  AAD   Stair negotiation: ascended and descended  NT** NT     ROM BUE:  See OT Note  BLE:  WFL       Strength BUE:  See OT Note  RLE: 2/5 hip and knee  No ankle AROM observed  LLE: 3/5  Improve Strength 1/3 MMT Grade   Balance Sitting EOB:  Jessie  Dynamic Standing:  MaxA x 2 HHA Sitting EOB: CGA  Dynamic standing:  Max A Sitting EOB:  Independent     Dynamic Standing:  S[place] AAD      Pt is A & O x unable to assess d/t aphasia. Pt following 75% of single step commands  Sensation:  Pt denies numbness and tingling to extremities  Edema:  unremarkable    Therapeutic Exercises:    Standing weight shift x10 with Max A and R knee block  6x sit<>stand from EOB    Patient education  Pt educated on safety with mobility, midline alignment and balance    Patient response to education:   Pt verbalized understanding Pt demonstrated skill Pt requires further education in this area   Yes, pt nods yes/no yes yes     ASSESSMENT:    Comments:  Pt resting semi-supine upon arrival, agreeable to PT session. Pt demonstrates difficulty with sequencing requiring step-wise cues to complete tasks. Pt attempting multiple times to press through L UE; but was encouraged to avoid WB through L UE due to recent fx. Pt demonstrates strong R lateral listing with standing posture but was able to achieve midline with Max A and vc's. Pt unable to complete L lateral shift with Max A to offload R LE for side stepping requiring total assist for progression of ea LE with attempts for side stepping. Pt returned to semi-supine upon completion of session with all needs in reach. Treatment:  Patient practiced and was instructed in the following treatment:     Bed mobility: completed with max cues for sequencing, manual assistance for trunk and R LE negotiation as noted above.  Transfer training: Max A for lift/lower and blocking of R knee and supporting R UE, completed as noted above    PLAN:    Patient is making good progress towards established goals. Will continue with current POC.         PLAN:    Time in  1455  Time out  1510    Total Treatment Time  15    CPT codes:  [] Gait training 46882 0 minutes  [] Manual therapy 77238 0 minutes  [x] Therapeutic activities 10019 15 minutes  [] Therapeutic exercises 97440 0 minutes  [] Neuromuscular reeducation 29563 0 minutes      David Umanzor, PT, DPT  [id number]

## 2020-11-17 NOTE — PROGRESS NOTES
Pt seen for swallowing exercises. Nurse reported him falling asleep on and off. Unable to follow 1 step directions and answer yes/no questions. Pt was asked his name and birthday and provided no response. Unable to participate in swallowing exercises due to inability to follow basic directions. Continue with POC.      [place], Graduate Clinician

## 2020-11-17 NOTE — PROGRESS NOTES
days   Feeding Currently NPO  Max A/dep to hold a cup with L hand and bring it to his mouth. Dep  Attempted with a taste of thickened juice with pt turning his head away & attempted to spit it out Minimal Assist    Grooming Maximal Assist   Appears to have agnosia- pt attempted to brush his teeth with a comb, max cues and facilitation to use comb appropriately Dep  Hand over hand assist to attempt simple grooming task with Poor results using L UE, pt pulling arm away Minimal Assist    UB Dressing Maximal Assist /dep Max/Dep   Poor cooperation this date from pt, appears resistive with tasks  Minimal Assist    LB Dressing Dependent   Dep  Donning socks bed level Moderate Assist    Bathing Dependent/max Hand over hand cues to bathe RUE  Dep  Poor cooperation with task Moderate Assist    Toileting Dependent   Dep  Incontinent of urine per staff Maximal Assist    Bed Mobility  Supine to sit: Moderate Assist   Sit to supine: Maximal Assist   Max A  Rolling R < > L   Bridging up towards [facility]  With tactile & verbal cues Supine to sit: Minimal Assist   Sit to supine: Minimal Assist    Functional Transfers Sit to stand: Maximal Assist with R knee blocked for improved balance  Stand to sit: Maximal Assist   Stand pivot: NT  NT  Due to pt being lethargic     Max A  Prior session    Minimal Assist    Functional Mobility NT     NT  Due to pt being lethargic     Max A x 2  Prior session  TBD   Balance Sitting:     Static:  Min A List to R but able to right self to midline with facilitation    Dynamic:max A  Standing: max A NT  Due to being lethargic  Sitting:     Static:  SBA    Dynamic:SBA  Standing: min A   Activity Tolerance Poor  Lethargic, attention span improved with sitting EOB  Poor  Lethargic, did not open eyes for follow commands, appeared resistive this date with tasks   O2 91-94% 2L   Fair+   Visual/  Perceptual Glasses: yes  Perception: not tested but appeared impaired.  R inattention           BUE  ROM/Strength/  Fine motor Coordination Hand dominance: R     RUE: ROM PROM WFL, trace finger ext, and elbow extension with tapping facilitation. Strength: grossly 1/5      Strength: absent    Coordination:  absent     LUE: ROM  WFL Cast to L wrist     Strength: grossly 3+/5      Strength: NT d/t cast     Coordination: fair-  R UE flaccid no trace of movement noted  RUE: pt to demo improved muscle tone throughout and begin active movement with facilitation.      Pt will be able to position RUE onto pillow with assist from LUE     Education:  Pt was educated through out treatment regarding proper technique & safety with bed mobility & simple ADL re-training/azalia-dressing techniques & self feeding to ease tasks to improve safety & prevent falls and allow pt to return home safely. Poor results this date     Comments: Upon arrival pt was in bed resting, [facility] for therapy. At end of session pt remained in bed, HOB elevated, tap system under L side of body, all lines and tubes intact, call light within reach. · Pt has made Poor progress towards set goals. · Continue with current plan of care    Treatment Time In: 9:05           Treatment Time Out: 9:35           Treatment Charges: Mins Units   Ther Ex  02312     Manual Therapy [phone]     Thera Activities 92157     ADL/Home Mgt 13753 30 2   Neuro Re-ed 17232     Group Therapy      Orthotic manage/training  56920     Non-Billable Time     Total Timed Treatment 30 2       Marva IZQUIERDO  [address] [place] [address], [address] [place] [address]

## 2020-11-17 NOTE — CARE COORDINATION
ORLIN spoke with Ford Vargas at [facility]. She states after reviewing therapy notes they do not feel patient is appropriate for 3 hours of rehab a day and is not thinking she will be able to accept patient. They are recommending [facility]. ORLIN called and spoke with patients daughter, Tray Downey at length. We discussed above. She states she does not any under circumstance want patient to go to a nursing home, not for rehab, not long term, no [facility]. We talked about options for home if he is not able to go to [facility] and she states understanding. She states she has a good support system and a lot of people that can help if he comes home. She is requesting that [facility] try for an auth for him and if it is denied by insurance she will take patient home. ORLIN called to speak with Ford Vargas at [facility]. She states if their doctor will not accept then she can not submit for precert. Have provided daughters number and requested Ford Vargas call and speak with Tray Downey.

## 2020-11-17 NOTE — PLAN OF CARE
Problem: Falls - Risk of:  Goal: Will remain free from falls  Description: Will remain free from falls  11/17/2020 0300 by Ryan Bustamante RN  Outcome: Met This Shift  11/17/2020 0300 by Ryan Bustamante RN  Outcome: Met This Shift  Goal: Absence of physical injury  Description: Absence of physical injury  11/17/2020 0300 by Ryan Bustamante RN  Outcome: Met This Shift  11/17/2020 0300 by Ryan Bustamante RN  Outcome: Met This Shift     Problem: ACTIVITY INTOLERANCE/IMPAIRED MOBILITY  Goal: Mobility/activity is maintained at optimum level for patient  Outcome: Met This Shift

## 2020-11-18 LAB
ALBUMIN SERPL-MCNC: 3.3 G/DL (ref 3.5–5.2)
ALP BLD-CCNC: 77 U/L (ref 40–129)
ALT SERPL-CCNC: 18 U/L (ref 0–40)
ANION GAP SERPL CALCULATED.3IONS-SCNC: 9 MMOL/L (ref 7–16)
AST SERPL-CCNC: 20 U/L (ref 0–39)
BILIRUB SERPL-MCNC: 0.8 MG/DL (ref 0–1.2)
BUN BLDV-MCNC: 13 MG/DL (ref 8–23)
CALCIUM SERPL-MCNC: 9 MG/DL (ref 8.6–10.2)
CHLORIDE BLD-SCNC: 113 MMOL/L (ref 98–107)
CO2: 24 MMOL/L (ref 22–29)
CREAT SERPL-MCNC: 0.9 MG/DL (ref 0.7–1.2)
GFR AFRICAN AMERICAN: >60
GFR NON-AFRICAN AMERICAN: >60 ML/MIN/1.73
GLUCOSE BLD-MCNC: 101 MG/DL (ref 74–99)
HCT VFR BLD CALC: 38.4 % (ref 37–54)
HEMOGLOBIN: 13 G/DL (ref 12.5–16.5)
MCH RBC QN AUTO: 31.8 PG (ref 26–35)
MCHC RBC AUTO-ENTMCNC: 33.9 % (ref 32–34.5)
MCV RBC AUTO: 93.9 FL (ref 80–99.9)
PDW BLD-RTO: 13.1 FL (ref 11.5–15)
PLATELET # BLD: 292 E9/L (ref 130–450)
PMV BLD AUTO: 10.8 FL (ref 7–12)
POTASSIUM SERPL-SCNC: 3.6 MMOL/L (ref 3.5–5)
RBC # BLD: 4.09 E12/L (ref 3.8–5.8)
SODIUM BLD-SCNC: 146 MMOL/L (ref 132–146)
TOTAL PROTEIN: 6 G/DL (ref 6.4–8.3)
WBC # BLD: 9.3 E9/L (ref 4.5–11.5)

## 2020-11-18 PROCEDURE — 85027 COMPLETE CBC AUTOMATED: CPT

## 2020-11-18 PROCEDURE — 6370000000 HC RX 637 (ALT 250 FOR IP): Performed by: INTERNAL MEDICINE

## 2020-11-18 PROCEDURE — 80053 COMPREHEN METABOLIC PANEL: CPT

## 2020-11-18 PROCEDURE — 97535 SELF CARE MNGMENT TRAINING: CPT

## 2020-11-18 PROCEDURE — 6360000002 HC RX W HCPCS: Performed by: INTERNAL MEDICINE

## 2020-11-18 PROCEDURE — 2700000000 HC OXYGEN THERAPY PER DAY

## 2020-11-18 PROCEDURE — 97530 THERAPEUTIC ACTIVITIES: CPT

## 2020-11-18 PROCEDURE — 92526 ORAL FUNCTION THERAPY: CPT

## 2020-11-18 PROCEDURE — 36415 COLL VENOUS BLD VENIPUNCTURE: CPT

## 2020-11-18 PROCEDURE — 2060000000 HC ICU INTERMEDIATE R&B

## 2020-11-18 RX ADMIN — HEPARIN SODIUM 5000 UNITS: 10000 INJECTION INTRAVENOUS; SUBCUTANEOUS at 17:25

## 2020-11-18 RX ADMIN — HEPARIN SODIUM 5000 UNITS: 10000 INJECTION INTRAVENOUS; SUBCUTANEOUS at 01:53

## 2020-11-18 RX ADMIN — CLOPIDOGREL 75 MG: 75 TABLET, FILM COATED ORAL at 09:29

## 2020-11-18 RX ADMIN — HEPARIN SODIUM 5000 UNITS: 10000 INJECTION INTRAVENOUS; SUBCUTANEOUS at 09:30

## 2020-11-18 RX ADMIN — ASPIRIN 81 MG CHEWABLE TABLET 81 MG: 81 TABLET CHEWABLE at 09:29

## 2020-11-18 ASSESSMENT — PAIN SCALES - GENERAL
PAINLEVEL_OUTOF10: 0
PAINLEVEL_OUTOF10: 0

## 2020-11-18 NOTE — PROGRESS NOTES
Physical Therapy  Facility/Department: [address] NEURO IMCU  Daily Treatment Note  NAME: Joann Eason  : 1945  MRN: [id number]    Date of Service: 2020    Referring Provider:  Kena Pineda DO       Evaluating PT:  Priyanka Olmos PT, DPT PT [id number]     Room #:  1207/5852-F  Diagnosis:  L MCA CVA  PMHx/PSHx:  Arthritis, HLD  Procedure/Surgery:    Precautions:  R Hemiparesis, Falls, Aphasia, L Wrist Fx 11/3/2020   Equipment Needs:  TBD     SUBJECTIVE:     Pt lives alone in a 1 story home with 3 stairs to enter and single rail. Bed is on first floor and bath is on first floor. Pt ambulated with no device independently PTA. Pt independent and very active PTA. Equipment Owned:  None     OBJECTIVE:    Initial Evaluation  Date: 2020 Treatment  Date: 2020 Short Term/ Long Term   Goals   AM-PAC 6 Clicks 78/54 72/97     Was pt agreeable to Eval/treatment? Yes yes     Does pt have pain? No c/o pain (no facial grimace noted) No signs of pain     Bed Mobility  Rolling: Jessie  Supine to sit: ModA  Sit to supine: Nt  Scooting: ModA Rolling : Jessie  Supine to sit Jessie  Sit to supine ModA Rolling: Independent  Supine to sit: Independent  Sit to supine: Independent  Scooting: Independent      Transfers Sit to stand: ModA x 2  Stand to sit: ModA x 2  Stand pivot: MaxA x 2 no AD  Sit to stand: [address] [place] with R knee block  Stand to sit: ModA  Stand pivot N/T Sit to stand: Jessie     Stand to sit: Jessie     Stand pivot: Jessie AAD   Ambulation    3 feet with MaxA x 2 HHA 3 side step x2 reps MaxA with R knee blocked and progression of ea LE >25 feet with Jessie  AAD   Stair negotiation: ascended and descended  NT** NT     ROM BUE:  See OT Note  BLE:  WFL       Strength BUE:  See OT Note  RLE: 2/5 hip and knee  No ankle AROM observed  LLE: 3/5  Improve Strength 1/3 MMT Grade   Balance Sitting EOB:  Jessie  Dynamic Standing:  MaxA x 2 HHA Sitting EOB: CGA  Dynamic standing:  Max A Sitting EOB:  Independent     Dynamic Standing: Jessie AAD      Pt is A & O x unable to assess d/t aphasia. Pt following 75% of single step commands  Sensation:  Pt denies numbness and tingling to extremities  Edema:  unremarkable    Therapeutic Exercises:    Standing weight shift x10 with Max A and R knee block  6x sit<>stand from EOB, LAQS B LEs 2x10    Patient education  Pt educated on safety with mobility, midline alignment and balance    Patient response to education:   Pt verbalized understanding Pt demonstrated skill Pt requires further education in this area   Yes, pt nods yes/no yes yes     ASSESSMENT:    Comments:  Pt resting semi-supine upon arrival, agreeable to PT session. Pt more alert and able to follow directions better but with increased time and verbal and tactile cues. Pt required decreased assistance for supine to sit. Pt sat EOB for over 10 minutes. Pt performed sit <> stand with R knee blocked. Pt able to advance R LE when sidestepping to L but required assistance to advance R LE when sidestepping to R. Pt retunred to bed and repositioned. Bed alarm applied and pt given call light. Treatment:  Patient practiced and was instructed in the following treatment:     Bed mobility: completed with max cues for sequencing, manual assistance for trunk and R LE negotiation as noted above.  Transfer training: Mod A for lift/lower and blocking of R knee and supporting R UE, completed as noted above    PLAN:    Patient is making good progress towards established goals. Will continue with current POC.         PLAN:    Time in 8:40  Time out  9:05    Total Treatment Time  25    CPT codes:  [] Gait training 38320 0 minutes  [] Manual therapy 92333 0 minutes  [x] Therapeutic activities 06561 25 minutes  [] Therapeutic exercises 26251 0 minutes  [] Neuromuscular reeducation 02120 0 minutes      Mendel Bridgeman [id number]

## 2020-11-18 NOTE — CARE COORDINATION
ORLIN spoke with patients daughter, Evelyn Bustamante. She states after speaking with Stroke Navigator yesterday at length she is now agreeable to [facility]. We talked about options and did send her a list.  She is going to do some research and talk to some people and call ORLIN back with choices.

## 2020-11-18 NOTE — PROGRESS NOTES
date!              Memory:  unable to assess              Sequencing: fair-              Problem solving: fair-              Judgement/safety:  poor                Functional Assessment:    Initial Eval Status  Date: 11/13/20 Treatment Status  Date:  11/18/20 STG=LTG  Time frame: 5-7 days   Feeding Currently NPO  Max A/dep to hold a cup with L hand and bring it to his mouth. Min-SBA  Holding cup to take a drink seated at EOB Minimal Assist    Grooming Maximal Assist   Appears to have agnosia- pt attempted to brush his teeth with a comb, max cues and facilitation to use comb appropriately Min A  With max verbal, visual cues to initiate task, once able to understand, able to complete task, washing face & combing hair, seated at EOB Minimal Assist    UB Dressing Maximal Assist /dep Mod/Max A  With instruction on azalia-dressing techniques & attend to R UE  Minimal Assist    LB Dressing Dependent   Max A  Simulated pants  With pt lifting L LE to assist with task, educated on one hand sock technique, will attempt once sitting balance improves Moderate Assist    Bathing Dependent/max Hand over hand cues to bathe RUE UB:  Mod A  LB: Max A  Sponge bathing, pt did attend to R UE Moderate Assist    Toileting Dependent   Dep  Incontinent of urine per staff, brief in place upon arrival Maximal Assist    Bed Mobility  Supine to sit: Moderate Assist   Sit to supine: Maximal Assist  Min A  Supine to sit   With tactile & verbal cues Supine to sit: Minimal Assist   Sit to supine: Minimal Assist    Functional Transfers Sit to stand: Maximal Assist with R knee blocked for improved balance  Stand to sit: Maximal Assist   Stand pivot: NT  Max-Mod A   Initial stand Max  Progressed Mod A completing 5 reps, focusing on midline & upright posture, R knee instability noted, blocked at times     Minimal Assist    Functional Mobility NT     Max A  Side stepping R < > L  Cues for posture with R knee blocked, assist with weight shifting & advancing R LE TBD   Balance Sitting:     Static:  Min A List to R but able to right self to midline with facilitation    Dynamic:max A  Standing: max A Sitting:   Static: Min/CGA  Dynamic: Mod A  Standing: Mod-Max A  R LE requires support Sitting:     Static:  SBA    Dynamic:SBA  Standing: min A   Activity Tolerance Poor  Lethargic, attention span improved with sitting EOB Fair+  Moderate tasks, RA 95%  Fully alert & participating in therapy   Fair+   Visual/  Perceptual Glasses: yes  Perception: not tested but appeared impaired. R inattention             BUE  ROM/Strength/  Fine motor Coordination Hand dominance: R     RUE: ROM PROM WFL, trace finger ext, and elbow extension with tapping facilitation. Strength: grossly 1/5      Strength: absent    Coordination:  absent     LUE: ROM  WFL Cast to L wrist     Strength: grossly 3+/5      Strength: NT d/t cast     Coordination: fair- R UE with proximal stability at elbow pt able to push & pull, able to complete shoulder shrug, elbow flex/ext, no movement noted at wrist or digits RUE: pt to demo improved muscle tone throughout and begin active movement with facilitation.      Pt will be able to position RUE onto pillow with assist from LUE     Education:  Pt was educated through out treatment regarding proper technique & safety with bed mobility, sitting/standing balance with Good neutral alignment, functional transfers, mobility & ADL re-training/azalia-dressing techniques & self feeding to ease tasks to improve safety & prevent falls and allow pt to return home safely. Educated pt on AROM/AAROM this date due to pt being alert to participate in tasks. Comments: Upon arrival pt was in bed & agreeable for therapy, nsg approval for therapy. At end of session pt remained in bed, HOB elevated, R UE supported with pillow for joint protection, & L UE for edema control with cast in place, all lines and tubes intact, call light within reach.      · Pt has made Fair+ progress towards set goals. · Continue with current plan of care    Treatment Time In: 8:40           Treatment Time Out: 9:25           Treatment Charges: Mins Units   Ther Ex  03481     Manual Therapy [id number] [facility]     Thera Activities 15891 15 1   ADL/Home Mgt 31971 10 1   Neuro Re-ed 07979     Group Therapy      Orthotic manage/training  77727     Non-Billable Time     Total Timed Treatment 25 2       Marva IZQUIERDO  [address] [place] [address], [address] [place] [address]

## 2020-11-18 NOTE — CARE COORDINATION
SW received call from daughter, Meño Huerta. She states after much thought and consideration. She would like to take patient home. She has been looking into [facility] but just can't commit to it now with all the covid things in place. She wants for patient to go to her house with [facility] and the [facility] and some equipment. SW spoke with Therapy at length regarding equipment needs if she should take patient home. Therapy states patient is much more alert and participated better today. They would recommend Christopher re look at patient. Have called and spoken with Regine Castillo, she will present to Dr altamirano again and review therapy updates.

## 2020-11-18 NOTE — CARE COORDINATION
ORLIN spoke with Yahir Rodriguez at [facility], they will accept, they have started precert. Await auth.

## 2020-11-18 NOTE — PROGRESS NOTES
Hospitalist Progress Note      PCP: Jordyn Hickman DO    Date of Admission: 11/12/2020    Chief Complaint: Unable to obtain    Hospital Course: This is a 62-year-old male with past medical history of CKD, hypertension, CAD, COPD, rheumatoid arthritis on methotrexate who presented to the emergency department with right sided weakness, aphasia, dysarthria. CT head showed acute/subacute left MCA distribution infarct; he was outside the window of TPA. Subsequent MRI brain confirmed left MCA stroke. Echocardiogram with bubble study was negative for cardioembolic source. Subjective: Pt was seen and examined at bedside. No acute event overnight; states he \"feels horrible\" but denies any pain or discomfort. Medications:  Reviewed    Infusion Medications    dextrose 5 % and 0.9 % NaCl 70 mL/hr at 11/18/20 0549     Scheduled Medications    heparin (porcine)  5,000 Units Subcutaneous Q8H    aspirin  300 mg Rectal Daily    clopidogrel  75 mg Oral Daily    sodium chloride flush  10 mL Intravenous 2 times per day    atorvastatin  80 mg Oral Nightly    aspirin  81 mg Oral Daily     PRN Meds: barium sulfate, sodium chloride flush, perflutren lipid microspheres, sodium chloride flush, acetaminophen **OR** acetaminophen, polyethylene glycol, ipratropium-albuterol      Intake/Output Summary (Last 24 hours) at 11/18/2020 0856  Last data filed at 11/18/2020 0549  Gross per 24 hour   Intake 1379.17 ml   Output --   Net 1379.17 ml       Exam:    BP (!) 148/72   Pulse 76   Temp 97.2 °F (36.2 °C) (Temporal)   Resp 22   Ht 5' 11\" (1.803 m)   Wt 187 lb 9.6 oz (85.1 kg)   SpO2 93%   BMI 26.16 kg/m²     General appearance: Sitting comfortably in bed. No apparent distress. Respiratory: Clear to auscultation bilaterally. Cardiovascular: Normal S1/S2. Regular rhythm and rate. Abdomen: Soft, non-tender, non-distended with normal bowel sounds. Musculoskeletal: No clubbing, cyanosis or edema bilaterally. Neurologic: Improving aphasia, right sided weakness, moves left extremitie; followed simple command  Psychiatric: Alert and oriented x2. Not agitated  Peripheral Pulses: +2 palpable, equal bilaterally       Labs:   Recent Labs     11/16/20  0503 11/17/20  0538 11/18/20  0420   WBC 11.4 9.6 9.3   HGB 13.7 13.0 13.0   HCT 40.6 39.0 38.4    274 292     Recent Labs     11/16/20  0503 11/17/20  0538 11/18/20  0420    144 146   K 3.5 3.5 3.6   * 112* 113*   CO2 20* 23 24   BUN 11 12 13   CREATININE 0.9 0.9 0.9   CALCIUM 8.7 8.7 9.0     Recent Labs     11/16/20  0503 11/17/20  0538 11/18/20  0420   AST 18 18 20   ALT 14 16 18   BILITOT 0.8 0.9 0.8   ALKPHOS 71 72 77     No results for input(s): INR in the last 72 hours. No results for input(s): Vicki Jessica in the last 72 hours. Radiology:  CT HEAD WO CONTRAST   Final Result   Evolving left MCA infarct with no evidence of hemorrhagic transformation or   other complication. Fluoroscopy modified barium swallow with video   Final Result      XR CHEST PORTABLE   Final Result   1. Faint ill-defined opacities in the medial left lower lung. Possibly   reflects underlying infection. Aspiration could have this appearance. 2.  Coarse interstitial markings and atherosclerotic disease. CTA HEAD W CONTRAST   Final Result   1. Severe attenuation of numerous M3/M4 branches scattered about the   mid/posterior left MCA territory, extending proximally through the left M1   segment to the level of the origin of the left anterior cerebral artery,   accompanied by moderate collateralization at the level of the left lateral   lenticulostriate vessels. 2. Moderate, predominantly hard atherosclerotic plaque is scattered   throughout the cavernous/supraclinoid segments of the left internal carotid   artery, producing scattered variable, short-segment, mild to moderate   stenoses.  Slightly lesser similar findings involve these segments on the   right side, extending through the petrous right ICA segment. Lesser   atherosclerotic arterial disease, is otherwise most notable at both common   carotid arterial bifurcations, right worse than left, where there are less   than 50% stenoses. Please see above comments. 3.  Previously-reported acute/subacute, non-hemorrhagic infarction scattered   about the mid/posterior aspect of the left MCA territory is again noted, not   significantly changed since noncontrast head CT obtained 11/14/2020 09:25   hours. .         CTA NECK W CONTRAST   Final Result   1. Severe attenuation of numerous M3/M4 branches scattered about the   mid/posterior left MCA territory, extending proximally through the left M1   segment to the level of the origin of the left anterior cerebral artery,   accompanied by moderate collateralization at the level of the left lateral   lenticulostriate vessels. 2. Moderate, predominantly hard atherosclerotic plaque is scattered   throughout the cavernous/supraclinoid segments of the left internal carotid   artery, producing scattered variable, short-segment, mild to moderate   stenoses. Slightly lesser similar findings involve these segments on the   right side, extending through the petrous right ICA segment. Lesser   atherosclerotic arterial disease, is otherwise most notable at both common   carotid arterial bifurcations, right worse than left, where there are less   than 50% stenoses. Please see above comments. 3.  Previously-reported acute/subacute, non-hemorrhagic infarction scattered   about the mid/posterior aspect of the left MCA territory is again noted, not   significantly changed since noncontrast head CT obtained 11/14/2020 09:25   hours.       .         FL MODIFIED BARIUM SWALLOW W VIDEO   Final Result   Positive aspiration to nectar consistencies of barium contrast      Please see separate speech pathology report for full discussion of findings   and recommendations. CT HEAD WO CONTRAST   Final Result   1. Slightly increased volume of, and mass effect related to, acute/subacute,   non-hemorrhagic infarction scattered about the mid/posterior aspect of the   left MCA territory, as described. Please see above comments. 2.  Otherwise, no significant change. [employer] RECOMMENDATIONS:   1. Recommend follow-up CT versus MR imaging of the brain, as directed   clinically. [facility] [facility] MRI BRAIN WO CONTRAST   Final Result   Acute subacute left MCA distribution infarct. Negative for hemorrhagic   transformation. Active Hospital Problems    Diagnosis Date Noted    HLD (hyperlipidemia) [E78.5] 11/13/2020    Acute right-sided weakness [R53.1] 11/13/2020    Rheumatoid arthritis ([facility] [address] [facility] [M06.9] 11/13/2020    Fall [Z97. XXXA] 11/13/2020    Altered mental status [R41.82] 11/13/2020    Essential hypertension [I10] 11/13/2020    CAD (coronary artery disease) [I25.10] 11/13/2020    Tobacco dependence [F17.200] 11/13/2020    Stage 3 chronic kidney disease [N18.30] 11/13/2020    Dysarthria due to acute cerebellar stroke ([facility] [address] [I63.9, R47.1] 11/13/2020    Acute ischemic cerebrovascular accident (CVA) involving left middle cerebral artery territory [facility]) [I63.512] 11/12/2020       Assessment  Acute left MCA ischemic infarct - still has residual aphasia, dysphagia  Acute encephalopathy - secondary to above, improving   Hyperlipidemia  Hypertension  CAD  COPD  Rheumatoid arthritis -  on methotrexate      Plan:  Continue with DAPT, Statin  Appreciate EP consult, 30-day event monitor recommended. Will recommend ILR implant if event monitor negative  Gentle IV hydration  Diet with consistency as recommended by SLP  Continue with PT/ST/SLP treatment as tolerated  Discharge planning discussed with case management, awaiting pre-CERT to [facility] rehab facility    DVT Prophylaxis: Subcutaneous heparin  Diet: DIET DYSPHAGIA PUREED;  Dysphagia Pureed; Extremely Thick (Spoon-Pudding)  Code Status: Full Code    PT/OT Eval Status: Ongoing    Dispo -  inpatient rehab    Phoebe Munguia MD 11/18/2020 8:56 AM

## 2020-11-19 LAB
ALBUMIN SERPL-MCNC: 2.8 G/DL (ref 3.5–5.2)
ALP BLD-CCNC: 67 U/L (ref 40–129)
ALT SERPL-CCNC: 20 U/L (ref 0–40)
ANION GAP SERPL CALCULATED.3IONS-SCNC: 12 MMOL/L (ref 7–16)
AST SERPL-CCNC: 24 U/L (ref 0–39)
BILIRUB SERPL-MCNC: 0.7 MG/DL (ref 0–1.2)
BUN BLDV-MCNC: 9 MG/DL (ref 8–23)
CALCIUM SERPL-MCNC: 7.9 MG/DL (ref 8.6–10.2)
CHLORIDE BLD-SCNC: 115 MMOL/L (ref 98–107)
CO2: 20 MMOL/L (ref 22–29)
CREAT SERPL-MCNC: 0.8 MG/DL (ref 0.7–1.2)
GFR AFRICAN AMERICAN: >60
GFR NON-AFRICAN AMERICAN: >60 ML/MIN/1.73
GLUCOSE BLD-MCNC: 408 MG/DL (ref 74–99)
HCT VFR BLD CALC: 37.8 % (ref 37–54)
HEMOGLOBIN: 12.3 G/DL (ref 12.5–16.5)
MAGNESIUM: 1.7 MG/DL (ref 1.6–2.6)
MCH RBC QN AUTO: 31.1 PG (ref 26–35)
MCHC RBC AUTO-ENTMCNC: 32.5 % (ref 32–34.5)
MCV RBC AUTO: 95.5 FL (ref 80–99.9)
METER GLUCOSE: 102 MG/DL (ref 74–99)
METER GLUCOSE: 122 MG/DL (ref 74–99)
METER GLUCOSE: 95 MG/DL (ref 74–99)
PDW BLD-RTO: 13 FL (ref 11.5–15)
PLATELET # BLD: 288 E9/L (ref 130–450)
PMV BLD AUTO: 11.4 FL (ref 7–12)
POTASSIUM SERPL-SCNC: 2.8 MMOL/L (ref 3.5–5)
POTASSIUM SERPL-SCNC: 3.5 MMOL/L (ref 3.5–5)
RBC # BLD: 3.96 E12/L (ref 3.8–5.8)
SODIUM BLD-SCNC: 147 MMOL/L (ref 132–146)
TOTAL PROTEIN: 5.2 G/DL (ref 6.4–8.3)
WBC # BLD: 9.6 E9/L (ref 4.5–11.5)

## 2020-11-19 PROCEDURE — 85027 COMPLETE CBC AUTOMATED: CPT

## 2020-11-19 PROCEDURE — 36415 COLL VENOUS BLD VENIPUNCTURE: CPT

## 2020-11-19 PROCEDURE — 6360000002 HC RX W HCPCS: Performed by: INTERNAL MEDICINE

## 2020-11-19 PROCEDURE — 2060000000 HC ICU INTERMEDIATE R&B

## 2020-11-19 PROCEDURE — 82962 GLUCOSE BLOOD TEST: CPT

## 2020-11-19 PROCEDURE — 83735 ASSAY OF MAGNESIUM: CPT

## 2020-11-19 PROCEDURE — 2580000003 HC RX 258: Performed by: HOSPITALIST

## 2020-11-19 PROCEDURE — 2580000003 HC RX 258: Performed by: INTERNAL MEDICINE

## 2020-11-19 PROCEDURE — 97530 THERAPEUTIC ACTIVITIES: CPT

## 2020-11-19 PROCEDURE — 80053 COMPREHEN METABOLIC PANEL: CPT

## 2020-11-19 PROCEDURE — 84132 ASSAY OF SERUM POTASSIUM: CPT

## 2020-11-19 PROCEDURE — 6360000002 HC RX W HCPCS: Performed by: HOSPITALIST

## 2020-11-19 PROCEDURE — 97535 SELF CARE MNGMENT TRAINING: CPT

## 2020-11-19 PROCEDURE — 6370000000 HC RX 637 (ALT 250 FOR IP): Performed by: INTERNAL MEDICINE

## 2020-11-19 PROCEDURE — 92526 ORAL FUNCTION THERAPY: CPT

## 2020-11-19 RX ORDER — DEXTROSE MONOHYDRATE 50 MG/ML
100 INJECTION, SOLUTION INTRAVENOUS PRN
Status: DISCONTINUED | OUTPATIENT
Start: 2020-11-19 | End: 2020-11-20 | Stop reason: HOSPADM

## 2020-11-19 RX ORDER — ATORVASTATIN CALCIUM 80 MG/1
80 TABLET, FILM COATED ORAL NIGHTLY
Qty: 30 TABLET | Refills: 3 | DISCHARGE
Start: 2020-11-19

## 2020-11-19 RX ORDER — SODIUM CHLORIDE 9 MG/ML
INJECTION, SOLUTION INTRAVENOUS CONTINUOUS
Status: DISCONTINUED | OUTPATIENT
Start: 2020-11-19 | End: 2020-11-20 | Stop reason: HOSPADM

## 2020-11-19 RX ORDER — ASPIRIN 81 MG/1
81 TABLET, CHEWABLE ORAL DAILY
Qty: 30 TABLET | Refills: 3 | DISCHARGE
Start: 2020-11-20

## 2020-11-19 RX ORDER — MAGNESIUM SULFATE 1 G/100ML
1 INJECTION INTRAVENOUS PRN
Status: DISCONTINUED | OUTPATIENT
Start: 2020-11-19 | End: 2020-11-20 | Stop reason: HOSPADM

## 2020-11-19 RX ORDER — DEXTROSE MONOHYDRATE 25 G/50ML
12.5 INJECTION, SOLUTION INTRAVENOUS PRN
Status: DISCONTINUED | OUTPATIENT
Start: 2020-11-19 | End: 2020-11-20 | Stop reason: HOSPADM

## 2020-11-19 RX ORDER — POTASSIUM CHLORIDE 7.45 MG/ML
10 INJECTION INTRAVENOUS PRN
Status: DISCONTINUED | OUTPATIENT
Start: 2020-11-19 | End: 2020-11-20 | Stop reason: HOSPADM

## 2020-11-19 RX ORDER — POTASSIUM CHLORIDE 20 MEQ/1
40 TABLET, EXTENDED RELEASE ORAL PRN
Status: DISCONTINUED | OUTPATIENT
Start: 2020-11-19 | End: 2020-11-20 | Stop reason: HOSPADM

## 2020-11-19 RX ORDER — NICOTINE POLACRILEX 4 MG
15 LOZENGE BUCCAL PRN
Status: DISCONTINUED | OUTPATIENT
Start: 2020-11-19 | End: 2020-11-20 | Stop reason: HOSPADM

## 2020-11-19 RX ORDER — CLOPIDOGREL BISULFATE 75 MG/1
75 TABLET ORAL DAILY
Qty: 30 TABLET | Refills: 3 | DISCHARGE
Start: 2020-11-20

## 2020-11-19 RX ADMIN — HEPARIN SODIUM 5000 UNITS: 10000 INJECTION INTRAVENOUS; SUBCUTANEOUS at 08:49

## 2020-11-19 RX ADMIN — POTASSIUM CHLORIDE 10 MEQ: 10 INJECTION, SOLUTION INTRAVENOUS at 17:46

## 2020-11-19 RX ADMIN — ACETAMINOPHEN 650 MG: 650 SUPPOSITORY RECTAL at 02:01

## 2020-11-19 RX ADMIN — POTASSIUM CHLORIDE 10 MEQ: 10 INJECTION, SOLUTION INTRAVENOUS at 16:29

## 2020-11-19 RX ADMIN — DEXTROSE AND SODIUM CHLORIDE: 5; 900 INJECTION, SOLUTION INTRAVENOUS at 06:53

## 2020-11-19 RX ADMIN — SODIUM CHLORIDE: 9 INJECTION, SOLUTION INTRAVENOUS at 12:38

## 2020-11-19 RX ADMIN — ATORVASTATIN CALCIUM 80 MG: 80 TABLET, FILM COATED ORAL at 21:39

## 2020-11-19 RX ADMIN — HEPARIN SODIUM 5000 UNITS: 10000 INJECTION INTRAVENOUS; SUBCUTANEOUS at 17:45

## 2020-11-19 RX ADMIN — HEPARIN SODIUM 5000 UNITS: 10000 INJECTION INTRAVENOUS; SUBCUTANEOUS at 01:55

## 2020-11-19 RX ADMIN — ASPIRIN 81 MG CHEWABLE TABLET 81 MG: 81 TABLET CHEWABLE at 08:47

## 2020-11-19 RX ADMIN — CLOPIDOGREL 75 MG: 75 TABLET, FILM COATED ORAL at 08:47

## 2020-11-19 ASSESSMENT — PAIN SCALES - GENERAL
PAINLEVEL_OUTOF10: 0

## 2020-11-19 NOTE — PROGRESS NOTES
OT BEDSIDE TREATMENT NOTE      Date:2020  Patient Name: Itzel Stewart  MRN: [id number]  : 1945  Room: [address] [place] [address]-     Per OT Eval:    Referring Provider: Anuj Vicente DO     Evaluating OT: JORGE ALBERTO Caballero/L #[id number]     AM-PAC Daily Activity Raw Score:      Modified Kaden Scale   Score     Description  0             No symptoms  1             No significant disability despite symptoms  2             Slight disability; able to look after own affairs  3             Moderate disability; able to ambulate without assist/ requires assist with ADLs  4             Moderate/Severe disability;requires assist to ambulate/assist with ADLs  5             Severe disability;bedridden/incontinent   6               Score:   5     Recommended Adaptive Equipment/DME: To be further assessed       Diagnosis:    1. Cerebrovascular accident (CVA), unspecified mechanism ([facility] [address] [facility]       Pertinent Medical History: arthritis, hyperlipidemia     Precautions:  Falls, aphasia (expressive/receptive), R hemiplegia, bed alarm, cast to L wrist d/t surgery with pinning of wrist on 11/3/20 (per daughter)- NWB RUE until clarified by ortho. Home Living: per daughter: Pt lives alone in a 1 story with 3 step(s) to enter and 1 rail(s); bed/bath on 1st   Bathroom setup: tub shower combo  Equipment owned: none     Prior Level of Function: pt was non verbal d/t aphasia, daughter stated pt was Independent  with ADLs , Independent  with IADLs; using no AD for ambulation. Very active around the house  Driving: yes      Pain Level: denied any pain     Cognition: A&O: 2 grossly, difficult to assess due to aphasia, occasional clear one word response but mostly unintelligible speech, pt is awake, but fatigued noted, continues to demonstrate expressive & receptive aphasia therefore required direct 1 step simple command with max visual & verbal cues to complete tasks 50% of the time .                Memory:  unable to assess Sequencing: fair-              Problem solving: fair-              Judgement/safety:  poor                Functional Assessment:    Initial Eval Status  Date: 11/13/20 Treatment Status  Date:  11/19/20 STG=LTG  Time frame: 5-7 days   Feeding Currently NPO  Max A/dep to hold a cup with L hand and bring it to his mouth.   NT  Min A  Prior session  Minimal Assist    Grooming Maximal Assist   Appears to have agnosia- pt attempted to brush his teeth with a comb, max cues and facilitation to use comb appropriately Mod-Min A  With max verbal, visual cues to initiate task, once able to understand, able to complete task, washing face & combing hair, seated at EOB Minimal Assist    UB Dressing Maximal Assist /dep Max A  With instruction on azalia-dressing techniques & attend to R UE, Poor results due to fatigue this date  Minimal Assist    LB Dressing Dependent   Max A  Simulated pants  With pt lifting L LE to assist with task, educated on one hand sock technique, when more alert  Moderate Assist    Bathing Dependent/max Hand over hand cues to bathe RUE NT  UB: Mod A  LB: Max A  Prior session  Moderate Assist    Toileting Dependent   Dep  Incontinent per staff, brief in place upon arrival Maximal Assist    Bed Mobility  Supine to sit: Moderate Assist   Sit to supine: Maximal Assist  Min A  Supine to sit   With tactile & verbal cues Supine to sit: Minimal Assist   Sit to supine: Minimal Assist    Functional Transfers Sit to stand: Maximal Assist with R knee blocked for improved balance  Stand to sit: Maximal Assist   Stand pivot: NT  Mod A  Sit < > stand    completing 4 reps, focusing on midline & upright posture, R knee blocked at times     Minimal Assist    Functional Mobility NT     Max A  1 side step with R LE blocked  TBD   Balance Sitting:     Static:  Min A List to R but able to right self to midline with facilitation    Dynamic:max A  Standing: max A Sitting:   Static: Min  Dynamic: Mod A  Standing: Mod/Max A  R LE 81033 10 1   Neuro Re-ed 30950     Group Therapy      Orthotic manage/training  96428     Non-Billable Time     Total Timed Treatment 24 2       Marva IZQUIERDO  [address] [place] [address] Drive, [address] [facility]

## 2020-11-19 NOTE — DISCHARGE SUMMARY
Hospital Medicine Discharge Summary    Patient ID: Ed Ip      Patient's PCP: Ale Juan DO    Admit Date: 11/12/2020     Discharge Date:   11/20/2020    Admitting Physician: Fela Pineda DO     Discharge Physician: Todd Staples MD     Discharge Diagnoses: Active Hospital Problems    Diagnosis Date Noted    HLD (hyperlipidemia) [E78.5] 11/13/2020    Acute right-sided weakness [R53.1] 11/13/2020    Rheumatoid arthritis ([facility] [address] [M06.9] 11/13/2020    Fall [T94. XXXA] 11/13/2020    Altered mental status [R41.82] 11/13/2020    Essential hypertension [I10] 11/13/2020    CAD (coronary artery disease) [I25.10] 11/13/2020    Tobacco dependence [F17.200] 11/13/2020    Stage 3 chronic kidney disease [N18.30] 11/13/2020    Dysarthria due to acute cerebellar stroke ([facility] [address] [facility] [I63.9, R47.1] 11/13/2020    Acute ischemic cerebrovascular accident (CVA) involving left middle cerebral artery territory [facility]) [I63.512] 11/12/2020   Acute left MCA ischemic infarct  Acute encephalopathy   Hyperlipidemia  Hypertension  CAD  COPD  Rheumatoid arthritis  Hypokalemia      The patient was seen and examined on day of discharge and this discharge summary is in conjunction with any daily progress note from day of discharge.     Hospital Course: This is a 68-year-old male with past medical history of CKD, hypertension, CAD, COPD, rheumatoid arthritis on methotrexate who presented to the emergency department with right sided weakness, aphasia, dysarthria. CT head showed acute/subacute left MCA distribution infarct; he was outside the window of TPA. Subsequent MRI brain confirmed left MCA stroke. Echocardiogram with bubble study was negative for cardioembolic source. Electrophysiologist was consulted due to concern for cardioembolic source of stroke; 30 days event monitor was recommended and an ILR implant if monitor is nondiagnostic. Patient was discharged to inpatient rehab in good condition with DAPT, high intensity statin. Exam:     /69   Pulse 73   Temp 97.2 °F (36.2 °C) (Temporal)   Resp 20   Ht 5' 11\" (1.803 m)   Wt 184 lb 6.4 oz (83.6 kg)   SpO2 95%   BMI 25.72 kg/m²     General appearance: Lying comfortably in bed. No apparent distress. Respiratory: Clear to auscultation bilaterally. Cardiovascular: Normal S1/S2. Regular rhythm and rate. Abdomen: Soft, non-tender, non-distended with normal bowel sounds. Musculoskeletal: No clubbing, cyanosis or edema bilaterally. Neurologic: Improving aphasia, right sided weakness, moves left extremitie; followed simple command  Psychiatric: Alert and oriented x2. Not agitated  Peripheral Pulses: +2 palpable, equal bilaterally       Consults:     IP CONSULT TO NEUROLOGY  IP CONSULT TO SOCIAL WORK  IP CONSULT TO ELECTROPHYSIOLOGY      Disposition: [place] acute rehab facility    Condition at Discharge: Stable    Discharge Instructions/Follow-up: PCP, neurologist    Code Status:  Full Code     Activity: activity as tolerated    Diet: low fat, low cholesterol diet    Labs:  For convenience and continuity at follow-up the following most recent labs are provided:      CBC:    Lab Results   Component Value Date    WBC 9.6 11/19/2020    HGB 12.3 11/19/2020    HCT 37.8 11/19/2020  11/19/2020       Renal:    Lab Results   Component Value Date     11/19/2020    K 2.8 11/19/2020    K 4.2 06/18/2019     11/19/2020    CO2 20 11/19/2020    BUN 9 11/19/2020    CREATININE 0.8 11/19/2020    CALCIUM 7.9 11/19/2020    PHOS 2.9 11/12/2020       Discharge Medications:     Current Discharge Medication List      START taking these medications    Details   aspirin 81 MG chewable tablet Take 1 tablet by mouth daily  Qty: 30 tablet, Refills: 3      atorvastatin (LIPITOR) 80 MG tablet Take 1 tablet by mouth nightly  Qty: 30 tablet, Refills: 3      clopidogrel (PLAVIX) 75 MG tablet Take 1 tablet by mouth daily  Qty: 30 tablet, Refills: 3         CONTINUE these medications which have NOT CHANGED    Details   albuterol sulfate  (90 Base) MCG/ACT inhaler Inhale 2 puffs into the lungs every 6 hours as needed for Wheezing      methotrexate (RHEUMATREX) 2.5 MG chemo tablet Take 12.5 mg by mouth once a week Sunday      predniSONE (DELTASONE) 20 MG tablet Take 20 mg by mouth daily as needed ([facility])          STOP taking these medications       simvastatin (ZOCOR) 10 MG tablet Comments:   Reason for Stopping:               Time Spent on discharge is more than 45 minutes in the examination, evaluation, counseling and review of medications and discharge plan. Signed:    Gilberto Raymond MD   11/19/2020      Thank you Art Bridges DO for the opportunity to be involved in this patient's care. If you have any questions or concerns please feel free to contact me at [phone].

## 2020-11-19 NOTE — DISCHARGE INSTR - COC
Continuity of Care Form    Patient Name: Angely Carver   :  1945  MRN:  [id number]    Admit date:  2020  Discharge date:  2020    Code Status Order: Full Code   Advance Directives:   [facility] Documentation       Date/Time Healthcare Directive Type of Healthcare Directive Copy in [facility] [address] Agent's Name Healthcare Agent's Phone Number    206  -- -- -- -- -- --    20  No, patient does not have an advance directive for healthcare treatment -- -- -- -- --            Admitting Physician:  Carla Castelan DO  PCP: Jovi Higgins DO    Discharging Nurse: Maryann Sesay Unit/Room#: 6449/8156-S  Discharging Unit Phone Number: [phone]    Emergency Contact:   Extended Emergency Contact Information  Primary Emergency Contact: [address] Phone: [phone]  Mobile Phone: [phone]  Relation: Child  Secondary Emergency Contact: [facility]  Mobile Phone: [phone]  Relation: Niece/Nephew  Preferred language: English   needed? No    Past Surgical History:  Past Surgical History:   Procedure Laterality Date    FRACTURE SURGERY         Immunization History: There is no immunization history on file for this patient. Active Problems:  Patient Active Problem List   Diagnosis Code    Perforated viscus R19.8    Diverticulitis of large intestine without bleeding K57.32    Acute ischemic cerebrovascular accident (CVA) involving left middle cerebral artery territory [facility]) I63.512    HLD (hyperlipidemia) E78.5    Acute right-sided weakness R53.1    Rheumatoid arthritis ([facility] [address] M06.9    Fall W19. Henrey Kasia Altered mental status R41.82    Essential hypertension I10    CAD (coronary artery disease) I25.10    Tobacco dependence F17.200    Stage 3 chronic kidney disease N18.30    Dysarthria due to acute cerebellar stroke (HCC) I63.9, R47.1       Isolation/Infection: Isolation            No Isolation          Patient Infection Status       Infection Onset Added Last Indicated Last Indicated By Review Planned Expiration Resolved Resolved By    None active    Resolved    COVID-19 Rule Out 11/16/20 11/16/20 11/16/20 Covid-19 Ambulatory (Ordered)   11/17/20 Rule-Out Test Resulted            Nurse Assessment:  Last Vital Signs: /69   Pulse 73   Temp 97.2 °F (36.2 °C) (Temporal)   Resp 20   Ht 5' 11\" (1.803 m)   Wt 184 lb 6.4 oz (83.6 kg)   SpO2 95%   BMI 25.72 kg/m²     Last documented pain score (0-10 scale): Pain Level: 0  Last Weight:   Wt Readings from Last 1 Encounters:   11/19/20 184 lb 6.4 oz (83.6 kg)     Mental Status:  alert    IV Access:  - None    Nursing Mobility/ADLs:  Walking   Dependent  Transfer  Dependent  Bathing  Dependent  Dressing  Dependent  Toileting  Dependent  Feeding  Dependent  Med Admin  Dependent  Med Delivery   crushed and prefers mixed with pudding     Wound Care Documentation and Therapy:        Elimination:  Continence:   · Bowel: No  · Bladder: No  Urinary Catheter: None   Colostomy/Ileostomy/Ileal Conduit: No       Date of Last BM: 11/19/2020    Intake/Output Summary (Last 24 hours) at 11/19/2020 1611  Last data filed at 11/19/2020 1330  Gross per 24 hour   Intake 660 ml   Output --   Net 660 ml     I/O last 3 completed shifts: In: 65 [P.O.:660]  Out: -     Safety Concerns:     History of Falls (last 30 days)    Impairments/Disabilities:      Language Barrier - aphasic     Nutrition Therapy:  Current Nutrition Therapy:   - Oral Diet:  Dysphagia 1 pureed  - pudding thick liquid     Routes of Feeding: Oral  Liquids: Spoon Thick Liquids (Pudding)  Daily Fluid Restriction: no  Last Modified Barium Swallow with Video (Video Swallowing Test): not done    Treatments at the Time of Hospital Discharge:   Respiratory Treatments: ***  Oxygen Therapy:  is not on home oxygen therapy.   Ventilator:    - No ventilator support    Rehab Therapies:

## 2020-11-19 NOTE — PLAN OF CARE
Problem: Falls - Risk of:  Goal: Will remain free from falls  Description: Will remain free from falls  Outcome: Met This Shift  Goal: Absence of physical injury  Description: Absence of physical injury  Outcome: Met This Shift     Problem: HEMODYNAMIC STATUS  Goal: Patient has stable vital signs and fluid balance  Outcome: Met This Shift     Problem: ACTIVITY INTOLERANCE/IMPAIRED MOBILITY  Goal: Mobility/activity is maintained at optimum level for patient  Outcome: Met This Shift     Problem: COMMUNICATION IMPAIRMENT  Goal: Ability to express needs and understand communication  Outcome: Met This Shift     Problem: Neurological  Goal: Maximum potential motor/sensory/cognitive function  Outcome: Met This Shift     Problem: Skin Integrity:  Goal: Will show no infection signs and symptoms  Description: Will show no infection signs and symptoms  Outcome: Met This Shift  Goal: Absence of new skin breakdown  Description: Absence of new skin breakdown  Outcome: Met This Shift     Problem: Pain:  Goal: Pain level will decrease  Description: Pain level will decrease  Outcome: Met This Shift  Goal: Control of acute pain  Description: Control of acute pain  Outcome: Met This Shift  Goal: Control of chronic pain  Description: Control of chronic pain  Outcome: Met This Shift

## 2020-11-19 NOTE — PROGRESS NOTES
Message sent to Dr. Vijay Pearson regarding patients daughter requesting that the patients cast and sutures are removed prior to discharge.

## 2020-11-19 NOTE — PROGRESS NOTES
Hospitalist Progress Note      PCP: Oscar Bradley DO    Date of Admission: 11/12/2020    Chief Complaint: Unable to obtain    Hospital Course: This is a 69-year-old male with past medical history of CKD, hypertension, CAD, COPD, rheumatoid arthritis on methotrexate who presented to the emergency department with right sided weakness, aphasia, dysarthria. CT head showed acute/subacute left MCA distribution infarct; he was outside the window of TPA. Subsequent MRI brain confirmed left MCA stroke. Echocardiogram with bubble study was negative for cardioembolic source. Subjective: Pt was seen and examined at bedside. No acute event overnight; denies any pain or discomfort.   Review of system is unreliable due to poor mental status, expressive aphasia, dysarthria    Medications:  Reviewed    Infusion Medications    dextrose      sodium chloride       Scheduled Medications    insulin lispro  0-12 Units Subcutaneous TID WC    insulin lispro  0-6 Units Subcutaneous Nightly    heparin (porcine)  5,000 Units Subcutaneous Q8H    aspirin  300 mg Rectal Daily    clopidogrel  75 mg Oral Daily    sodium chloride flush  10 mL Intravenous 2 times per day    atorvastatin  80 mg Oral Nightly    aspirin  81 mg Oral Daily     PRN Meds: glucose, dextrose, glucagon (rDNA), dextrose, magnesium sulfate, potassium chloride **OR** potassium alternative oral replacement **OR** potassium chloride, barium sulfate, sodium chloride flush, perflutren lipid microspheres, sodium chloride flush, acetaminophen **OR** acetaminophen, polyethylene glycol, ipratropium-albuterol      Intake/Output Summary (Last 24 hours) at 11/19/2020 1021  Last data filed at 11/19/2020 0945  Gross per 24 hour   Intake 240 ml   Output --   Net 240 ml       Exam:    BP (!) 167/81   Pulse 71   Temp 98.7 °F (37.1 °C) (Temporal)   Resp 22   Ht 5' 11\" (1.803 m)   Wt 184 lb 6.4 oz (83.6 kg)   SpO2 93%   BMI 25.72 kg/m²     General appearance: Sleeping comfortably in bed, easy to arouse. No apparent distress. Respiratory: Clear to auscultation bilaterally. Cardiovascular: Normal S1/S2. Regular rhythm and rate. Abdomen: Soft, non-tender, non-distended with normal bowel sounds. Musculoskeletal: No clubbing, cyanosis or edema bilaterally. Neurologic: Improving aphasia, right sided weakness, moves left extremitie; followed simple command  Psychiatric: Alert and oriented x2. Not agitated  Peripheral Pulses: +2 palpable, equal bilaterally       Labs:   Recent Labs     11/17/20 0538 11/18/20 0420 11/19/20  0545   WBC 9.6 9.3 9.6   HGB 13.0 13.0 12.3*   HCT 39.0 38.4 37.8    292 288     Recent Labs     11/17/20 0538 11/18/20 0420 11/19/20  0545    146 147*   K 3.5 3.6 2.8*   * 113* 115*   CO2 23 24 20*   BUN 12 13 9   CREATININE 0.9 0.9 0.8   CALCIUM 8.7 9.0 7.9*     Recent Labs     11/17/20 0538 11/18/20 0420 11/19/20  0545   AST 18 20 24   ALT 16 18 20   BILITOT 0.9 0.8 0.7   ALKPHOS 72 77 67     No results for input(s): INR in the last 72 hours. No results for input(s): Towana Ball in the last 72 hours. Radiology:  CT HEAD WO CONTRAST   Final Result   Evolving left MCA infarct with no evidence of hemorrhagic transformation or   other complication. Fluoroscopy modified barium swallow with video   Final Result      XR CHEST PORTABLE   Final Result   1. Faint ill-defined opacities in the medial left lower lung. Possibly   reflects underlying infection. Aspiration could have this appearance. 2.  Coarse interstitial markings and atherosclerotic disease. CTA HEAD W CONTRAST   Final Result   1.   Severe attenuation of numerous M3/M4 branches scattered about the   mid/posterior left MCA territory, extending proximally through the left M1   segment to the level of the origin of the left anterior cerebral artery,   accompanied by moderate collateralization at the level of the left lateral   lenticulostriate vessels. 2. Moderate, predominantly hard atherosclerotic plaque is scattered   throughout the cavernous/supraclinoid segments of the left internal carotid   artery, producing scattered variable, short-segment, mild to moderate   stenoses. Slightly lesser similar findings involve these segments on the   right side, extending through the petrous right ICA segment. Lesser   atherosclerotic arterial disease, is otherwise most notable at both common   carotid arterial bifurcations, right worse than left, where there are less   than 50% stenoses. Please see above comments. 3.  Previously-reported acute/subacute, non-hemorrhagic infarction scattered   about the mid/posterior aspect of the left MCA territory is again noted, not   significantly changed since noncontrast head CT obtained 11/14/2020 09:25   hours. .         CTA NECK W CONTRAST   Final Result   1. Severe attenuation of numerous M3/M4 branches scattered about the   mid/posterior left MCA territory, extending proximally through the left M1   segment to the level of the origin of the left anterior cerebral artery,   accompanied by moderate collateralization at the level of the left lateral   lenticulostriate vessels. 2. Moderate, predominantly hard atherosclerotic plaque is scattered   throughout the cavernous/supraclinoid segments of the left internal carotid   artery, producing scattered variable, short-segment, mild to moderate   stenoses. Slightly lesser similar findings involve these segments on the   right side, extending through the petrous right ICA segment. Lesser   atherosclerotic arterial disease, is otherwise most notable at both common   carotid arterial bifurcations, right worse than left, where there are less   than 50% stenoses. Please see above comments.       3.  Previously-reported acute/subacute, non-hemorrhagic infarction scattered   about the mid/posterior aspect of the left MCA territory is again noted, not negative  Gentle IV hydration  Monitor renal function, replete electrolytes as needed  Diet with consistency as recommended by SLP  Continue with PT/ST/SLP treatment as tolerated  Discharge planning discussed with case management, awaiting pre-CERT to [facility] rehab facility    DVT Prophylaxis: Subcutaneous heparin  Diet: DIET DYSPHAGIA PUREED; Dysphagia Pureed;  Extremely Thick (Spoon-Pudding)  Dietary Nutrition Supplements: Other Oral Supplement (see comment)  Code Status: Full Code    PT/OT Eval Status: Ongoing    Dispo -  inpatient rehab    Marc Box MD 11/19/2020 10:21 AM

## 2020-11-19 NOTE — PROGRESS NOTES
Comprehensive Nutrition Assessment    Type and Reason for Visit:  Initial    Nutrition Recommendations/Plan: Continue current diet, Start Boost Pudding TID    Nutrition Assessment:  Pt nutritionally at risk w/ decreased PO intake on modified texture 2/2 dysphagia s/p CVA. Will provide ONS and monitor    Malnutrition Assessment:  Malnutrition Status: At risk for malnutrition (Comment)    Context:  Acute Illness     Findings of the 6 clinical characteristics of malnutrition:  Energy Intake:  7 - 50% or less of estimated energy requirements for 5 or more days  Weight Loss:  Unable to assess(d/t lack of hx on file)     Body Fat Loss:  No significant body fat loss     Muscle Mass Loss:  No significant muscle mass loss    Fluid Accumulation:  No significant fluid accumulation     Strength:  Not Performed    Estimated Daily Nutrient Needs:  Energy (kcal):  ; Weight Used for Energy Requirements:  Current     Protein (g):  ; Weight Used for Protein Requirements:  Ideal(1.2-1.4)        Fluid (ml/day):  ; Method Used for Fluid Requirements:  1 ml/kcal      Nutrition Related Findings:  expressive aphasia s/p CVA, abd WDL, no noted edema, +I/Os      Wounds:  None       Current Nutrition Therapies:    DIET DYSPHAGIA PUREED; Dysphagia Pureed; Extremely Thick (Spoon-Pudding)    Anthropometric Measures:  · Height: 5' 11\" (180.3 cm)  · Current Body Weight: 184 lb (83.5 kg)(bed scale 11/19)   · Admission Body Weight: 183 lb (83 kg)(bed scale 11/13)    · Usual Body Weight: 200 lb (90.7 kg)(stated per EMR 10/2020, no actual wt hx on file)     · Ideal Body Weight: 172 lbs; % Ideal Body Weight 107 %   · BMI: 25.7  · BMI Categories: Overweight (BMI 25.0-29. 9)       Nutrition Diagnosis:   · Inadequate oral intake related to cognitive or neurological impairment as evidenced by intake 0-25%, poor intake prior to admission      Nutrition Interventions:   Food and/or Nutrient Delivery:  Continue Current Diet, Start Oral Nutrition Supplement(Boost Pudding TID)  Nutrition Education/Counseling:  Education not appropriate   Coordination of Nutrition Care:  Continue to monitor while inpatient    Goals:  Consume >50% meals/ONS       Nutrition Monitoring and Evaluation:   Food/Nutrient Intake Outcomes:  Food and Nutrient Intake, Supplement Intake  Physical Signs/Symptoms Outcomes:  Biochemical Data, Chewing or Swallowing, GI Status, Fluid Status or Edema, Nutrition Focused Physical Findings, Skin, Weight     Discharge Planning:     Too soon to determine     Electronically signed by Tomy Garvey MS, RD, LD on 11/19/20 at 10:06 AM EST    Contact: [phone]

## 2020-11-19 NOTE — CARE COORDINATION
Jade from [facility] called and states they have auth for patient. Covid was negative 11/17/20. Per Dr needs Potassium re drawn before discharge, have arranged for [facility] Call with [facility] for discharge transport. RN will need to call when ready to discharge [phone]. Have called and notified daughter Kathe Contreras, She is requesting RN call her with time of transport once scheduled. She also states that she does not want Cast and sutures to hold up discharge, if Dr at [facility] can take care of getting them off she is in agreement with that plan. All discharge paperwork on soft chart.

## 2020-11-19 NOTE — PROGRESS NOTES
Physical Therapy  Facility/Department: [address] NEURO IMCU  Daily Treatment Note  NAME: Hugh Rodriguez  : 1945  MRN: [id number]    Date of Service: 2020    Referring Provider:  DO Kike Elizabeth PT:  Kale Ware PT, DPT PT [id number]     Room #:  0775/2085-B  Diagnosis:  L MCA CVA  PMHx/PSHx:  Arthritis, HLD  Procedure/Surgery:    Precautions:  R Hemiparesis, Falls, Aphasia, L Wrist Fx 11/3/2020   Equipment Needs:  TBD     SUBJECTIVE:     Pt lives alone in a 1 story home with 3 stairs to enter and single rail. Bed is on first floor and bath is on first floor. Pt ambulated with no device independently PTA. Pt independent and very active PTA. Equipment Owned:  None     OBJECTIVE:    Initial Evaluation  Date: 2020 Treatment  Date: 2020 Short Term/ Long Term   Goals   AM-PAC 6 Clicks 50/34 31/53     Was pt agreeable to Eval/treatment? Yes yes     Does pt have pain? No c/o pain (no facial grimace noted) No signs of pain     Bed Mobility  Rolling: Jessie  Supine to sit: ModA  Sit to supine: Nt  Scooting: ModA Rolling : Jessie  Supine to sit Jessie  Sit to supine ModA Rolling: Independent  Supine to sit: Independent  Sit to supine: Independent  Scooting: Independent      Transfers Sit to stand: ModA x 2  Stand to sit: ModA x 2  Stand pivot: MaxA x 2 no AD  Sit to stand: MaxA with R knee block  Stand to sit: ModA  Stand pivot N/T Sit to stand: Jessie     Stand to sit: Jessie     Stand pivot: Jessie AAD   Ambulation    3 feet with MaxA x 2 HHA 3 side step x2 reps MaxA with R knee blocked and progression of R LE >25 feet with Jessie  AAD   Stair negotiation: ascended and descended  NT** NT     ROM BUE:  See OT Note  BLE:  WFL       Strength BUE:  See OT Note  RLE: 2/5 hip and knee  No ankle AROM observed  LLE: 3/5  Improve Strength 1/3 MMT Grade   Balance Sitting EOB:  Jessie  Dynamic Standing:  MaxA x 2 HHA Sitting EOB: CGA  Dynamic standing:  Max A Sitting EOB:  Independent     Dynamic Standing: Jessie AAD      Pt is A & O x unable to assess d/t aphasia. Pt following 50% of single step commands  Sensation:  Pt denies numbness and tingling to extremities  Edema:  unremarkable    Therapeutic Exercises:    Standing weight shift x10 with Max A and R knee block  4x sit<>stand from EOB, LAQS B LEs 2x10    Patient education  Pt educated on safety with mobility, midline alignment and balance    Patient response to education:   Pt verbalized understanding Pt demonstrated skill Pt requires further education in this area   Yes, pt nods yes/no yes yes     ASSESSMENT:    Comments:  Pt resting semi-supine upon arrival, agreeable to PT session. Pt with increased fatigue this a.m. Cues to remain alert. Pt able to follow commands 50% of time with increased time verbal and tactile cues. Treatment:  Patient practiced and was instructed in the following treatment:     Bed mobility: completed with max cues for sequencing, manual assistance for trunk and R LE negotiation as noted above.  Transfer training: Mod A for lift/lower and blocking of R knee and supporting R UE, completed as noted above    PLAN:    Patient is making good progress towards established goals. Will continue with current POC.         PLAN:    Time in 9:00  Time out 9:25     Total Treatment Time  25    CPT codes:  [] Gait training 57439 0 minutes  [] Manual therapy 97149 0 minutes  [x] Therapeutic activities 29035 25 minutes  [] Therapeutic exercises 13350 0 minutes  [] Neuromuscular reeducation 10849 0 minutes      Liliya Zheng [id number]

## 2020-11-19 NOTE — PROGRESS NOTES
Pt seen for swallowing exercises and meal monitor. Pt unable to follow directions to participate fully in oral motor exercises. Nurse reported no pocketing during morning meal and assisted him to take small bites/sips. Noticeably more fatigued than yesterday. Less vocalizations attempted. Continue with POC.      Piper Duarte, Graduate Clinician

## 2020-11-20 ENCOUNTER — TELEPHONE (OUTPATIENT)
Dept: NON INVASIVE DIAGNOSTICS | Age: 75
End: 2020-11-20

## 2020-11-20 VITALS
HEIGHT: 71 IN | TEMPERATURE: 98.4 F | RESPIRATION RATE: 20 BRPM | WEIGHT: 184.4 LBS | DIASTOLIC BLOOD PRESSURE: 67 MMHG | BODY MASS INDEX: 25.81 KG/M2 | OXYGEN SATURATION: 94 % | SYSTOLIC BLOOD PRESSURE: 118 MMHG | HEART RATE: 72 BPM
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
## 2024-12-27 NOTE — PROGRESS NOTES
Pt blood sugar is 78. In order is in place to notify  When less than 80. Dr. Daniels Purple notified. Applied